# Patient Record
Sex: FEMALE | Race: WHITE | Employment: OTHER | URBAN - METROPOLITAN AREA
[De-identification: names, ages, dates, MRNs, and addresses within clinical notes are randomized per-mention and may not be internally consistent; named-entity substitution may affect disease eponyms.]

---

## 2017-03-16 RX ORDER — ALPRAZOLAM 0.5 MG/1
TABLET ORAL
Qty: 60 TABLET | Refills: 0 | Status: SHIPPED
Start: 2017-03-16 | End: 2017-04-13

## 2017-04-14 RX ORDER — ZOLPIDEM TARTRATE 10 MG/1
TABLET ORAL
Qty: 30 TABLET | Refills: 0 | Status: SHIPPED
Start: 2017-04-14 | End: 2017-07-03

## 2017-04-14 RX ORDER — ALPRAZOLAM 0.5 MG/1
TABLET ORAL
Qty: 60 TABLET | Refills: 0 | Status: SHIPPED
Start: 2017-04-14 | End: 2017-05-13

## 2017-05-15 RX ORDER — SIMVASTATIN 20 MG
TABLET ORAL
Qty: 30 TABLET | Refills: 0 | Status: SHIPPED | OUTPATIENT
Start: 2017-05-15 | End: 2017-07-03

## 2017-05-16 RX ORDER — ALPRAZOLAM 0.5 MG/1
TABLET ORAL
Qty: 60 TABLET | Refills: 1 | Status: SHIPPED
Start: 2017-05-16 | End: 2017-07-03

## 2017-07-03 ENCOUNTER — OFFICE VISIT (OUTPATIENT)
Dept: FAMILY MEDICINE CLINIC | Facility: CLINIC | Age: 52
End: 2017-07-03

## 2017-07-03 VITALS
TEMPERATURE: 98 F | HEART RATE: 75 BPM | HEIGHT: 58 IN | BODY MASS INDEX: 22.67 KG/M2 | SYSTOLIC BLOOD PRESSURE: 108 MMHG | WEIGHT: 108 LBS | DIASTOLIC BLOOD PRESSURE: 72 MMHG | RESPIRATION RATE: 16 BRPM | OXYGEN SATURATION: 97 %

## 2017-07-03 DIAGNOSIS — F41.1 GAD (GENERALIZED ANXIETY DISORDER): ICD-10-CM

## 2017-07-03 DIAGNOSIS — E78.2 MIXED HYPERLIPIDEMIA: ICD-10-CM

## 2017-07-03 DIAGNOSIS — E03.9 HYPOTHYROIDISM, UNSPECIFIED TYPE: ICD-10-CM

## 2017-07-03 DIAGNOSIS — M54.50 ACUTE MIDLINE LOW BACK PAIN WITHOUT SCIATICA: Primary | ICD-10-CM

## 2017-07-03 DIAGNOSIS — Z12.39 SCREENING FOR BREAST CANCER: ICD-10-CM

## 2017-07-03 DIAGNOSIS — G43.001 MIGRAINE WITHOUT AURA AND WITH STATUS MIGRAINOSUS, NOT INTRACTABLE: ICD-10-CM

## 2017-07-03 PROCEDURE — 99214 OFFICE O/P EST MOD 30 MIN: CPT | Performed by: PHYSICIAN ASSISTANT

## 2017-07-03 RX ORDER — SUMATRIPTAN 100 MG/1
100 TABLET, FILM COATED ORAL EVERY 2 HOUR PRN
Qty: 10 TABLET | Refills: 3 | Status: SHIPPED | OUTPATIENT
Start: 2017-07-03 | End: 2017-10-17

## 2017-07-03 RX ORDER — ZOLPIDEM TARTRATE 10 MG/1
TABLET ORAL
Qty: 30 TABLET | Refills: 0 | Status: SHIPPED | OUTPATIENT
Start: 2017-07-03 | End: 2017-10-25

## 2017-07-03 RX ORDER — ALPRAZOLAM 0.5 MG/1
TABLET ORAL
Qty: 60 TABLET | Refills: 1 | Status: SHIPPED | OUTPATIENT
Start: 2017-07-03 | End: 2017-09-26

## 2017-07-03 RX ORDER — SIMVASTATIN 40 MG
40 TABLET ORAL NIGHTLY
Qty: 30 TABLET | Refills: 2 | Status: SHIPPED | OUTPATIENT
Start: 2017-07-03 | End: 2017-10-25 | Stop reason: ALTCHOICE

## 2017-07-03 RX ORDER — MECLIZINE HCL 12.5 MG/1
TABLET ORAL
Qty: 30 TABLET | Refills: 1 | Status: SHIPPED | OUTPATIENT
Start: 2017-07-03 | End: 2017-09-26

## 2017-07-03 RX ORDER — LEVOTHYROXINE SODIUM 0.1 MG/1
TABLET ORAL
Qty: 30 TABLET | Refills: 2 | Status: SHIPPED | OUTPATIENT
Start: 2017-07-03 | End: 2017-10-25

## 2017-07-03 RX ORDER — METHYLPREDNISOLONE 4 MG/1
TABLET ORAL
Qty: 1 KIT | Refills: 0 | Status: SHIPPED | OUTPATIENT
Start: 2017-07-03 | End: 2017-10-11

## 2017-07-03 NOTE — PROGRESS NOTES
CHIEF COMPLAINT:     Patient presents with:  Lab Results      HPI:   Monique Mercedes is a 46year old female who presents for lab results and medication refills. Pt is also c/o intermittent midline low back pain that comes and goes.  The pain is described a 30 tablet Rfl: 0   Meclizine HCl 12.5 MG Oral Tab TAKE 2 TABLETS BY MOUTH TWO TIMES A DAY AS NEEDED FOR DIZZINESS Disp: 30 tablet Rfl: 1   SUMAtriptan Succinate (IMITREX) 100 MG Oral Tab Take 1 tablet (100 mg total) by mouth every 2 (two) hours as needed f Alcohol use: Yes              Comment: occasionally       REVIEW OF SYSTEMS:   GENERAL: Denies fever, chills,weight change, decreased appetite  SKIN: Denies rashes, skin wounds or ulcers.   EYES: Denies blurred vision or double vision  HENT: Denies conges regimen. Migraine without aura and with status migrainosus, not intractable  -     Meclizine HCl 12.5 MG Oral Tab; TAKE 2 TABLETS BY MOUTH TWO TIMES A DAY AS NEEDED FOR DIZZINESS  -     SUMAtriptan Succinate (IMITREX) 100 MG Oral Tab;  Take 1 tablet (100

## 2017-07-06 ENCOUNTER — TELEPHONE (OUTPATIENT)
Dept: INTERNAL MEDICINE CLINIC | Facility: CLINIC | Age: 52
End: 2017-07-06

## 2017-07-06 NOTE — TELEPHONE ENCOUNTER
Angel Mueller was in Monday 6/3, labs/meds reviewed by Helane Lundborg. Simvastatin increased from 20mg to 40mg on 7/3 and will repeat liver, lipids in 3 months. Assume you do not want her to increase from 40-60mg?      Told pt will call back if statin med changes othe murmur loudness: II/VI

## 2017-07-06 NOTE — TELEPHONE ENCOUNTER
----- Message from Laura Mcmullen MD sent at 7/5/2017 10:47 AM CDT -----  Stable, increase statin to 60 mg q daily, repeat ast/alt and lipid in 3 months. Needs tsh and t4 in 3 months too.

## 2017-08-04 ENCOUNTER — TELEPHONE (OUTPATIENT)
Dept: FAMILY MEDICINE CLINIC | Facility: CLINIC | Age: 52
End: 2017-08-04

## 2017-08-04 NOTE — TELEPHONE ENCOUNTER
I spoke with Flor Begum from [address] [place] [address], she states they have no refills left for pt's Xanax, her last fill was on 7/3 but she states that was a refill from a previous Rx on file.   She has no records that pt abhay in her paper Script to them, even though she was giv

## 2017-08-04 NOTE — TELEPHONE ENCOUNTER
Felicia Menendez from [facility] called and said they don't have the script, please call in at [phone]

## 2017-09-26 DIAGNOSIS — F41.1 GAD (GENERALIZED ANXIETY DISORDER): ICD-10-CM

## 2017-09-26 DIAGNOSIS — G43.001 MIGRAINE WITHOUT AURA AND WITH STATUS MIGRAINOSUS, NOT INTRACTABLE: ICD-10-CM

## 2017-09-26 RX ORDER — MECLIZINE HCL 12.5 MG/1
TABLET ORAL
Qty: 30 TABLET | Refills: 1 | Status: SHIPPED | OUTPATIENT
Start: 2017-09-26 | End: 2017-10-25

## 2017-09-26 RX ORDER — ALPRAZOLAM 0.5 MG/1
TABLET ORAL
Qty: 60 TABLET | Refills: 1 | Status: SHIPPED
Start: 2017-09-26 | End: 2017-10-25

## 2017-09-26 NOTE — TELEPHONE ENCOUNTER
LOV 7/3/2017           LF 7/3/2017 for both medications pended     Please approve or deny Rx request.  Thank you!

## 2017-10-17 ENCOUNTER — TELEPHONE (OUTPATIENT)
Dept: FAMILY MEDICINE CLINIC | Facility: CLINIC | Age: 52
End: 2017-10-17

## 2017-10-17 DIAGNOSIS — G43.001 MIGRAINE WITHOUT AURA AND WITH STATUS MIGRAINOSUS, NOT INTRACTABLE: ICD-10-CM

## 2017-10-17 RX ORDER — SUMATRIPTAN 100 MG/1
100 TABLET, FILM COATED ORAL EVERY 2 HOUR PRN
Qty: 10 TABLET | Refills: 3 | Status: SHIPPED | OUTPATIENT
Start: 2017-10-17 | End: 2017-10-18

## 2017-10-17 NOTE — TELEPHONE ENCOUNTER
Pharmacy called for clarification of   SUMAtriptan Succinate (IMITREX) 100 MG Oral Tab 10 tablet     For patient, please call pharmacy back.

## 2017-10-17 NOTE — TELEPHONE ENCOUNTER
[facility] states they have requested the refill for the Imitrex 20 mg on 9/28/17 and 9/29/17 still awaiting response

## 2017-10-17 NOTE — TELEPHONE ENCOUNTER
Pharmacist states that the Imitrex is usually written \"take one tablet at onset of migraine. May repeat after 1 hour for max of 2 tabs daily\" Pharmacist would like to know if provider would like to change instructions. Please advise.   Thank you

## 2017-10-18 ENCOUNTER — HOSPITAL ENCOUNTER (OUTPATIENT)
Dept: GENERAL RADIOLOGY | Age: 52
Discharge: HOME | End: 2017-10-18
Attending: PHYSICIAN ASSISTANT
Payer: COMMERCIAL

## 2017-10-18 ENCOUNTER — HOSPITAL ENCOUNTER (OUTPATIENT)
Dept: MAMMOGRAPHY | Age: 52
Discharge: HOME | End: 2017-10-18
Attending: PHYSICIAN ASSISTANT
Payer: COMMERCIAL

## 2017-10-18 DIAGNOSIS — Z12.39 SCREENING FOR BREAST CANCER: ICD-10-CM

## 2017-10-18 DIAGNOSIS — M54.50 ACUTE MIDLINE LOW BACK PAIN WITHOUT SCIATICA: ICD-10-CM

## 2017-10-18 PROCEDURE — 72110 X-RAY EXAM L-2 SPINE 4/>VWS: CPT | Performed by: PHYSICIAN ASSISTANT

## 2017-10-18 PROCEDURE — 77067 SCR MAMMO BI INCL CAD: CPT | Performed by: PHYSICIAN ASSISTANT

## 2017-10-18 RX ORDER — SUMATRIPTAN 100 MG/1
TABLET, FILM COATED ORAL
Qty: 10 TABLET | Refills: 3 | Status: SHIPPED | OUTPATIENT
Start: 2017-10-18 | End: 2017-10-25

## 2017-10-18 NOTE — TELEPHONE ENCOUNTER
Its always been prescribed as \"Take 1 tablet (100 mg total) by mouth every 2 (two) hours as needed for Migraine. \"  (pharmacies always call and clarify when written like this)  Ok to add max 200mg/24h or as pharmacist recommends below?

## 2017-10-19 ENCOUNTER — TELEPHONE (OUTPATIENT)
Dept: FAMILY MEDICINE CLINIC | Facility: CLINIC | Age: 52
End: 2017-10-19

## 2017-10-19 NOTE — TELEPHONE ENCOUNTER
----- [facility] sent at 10/18/2017  2:02 PM CDT -----  Normal lumbar spine xray.

## 2017-10-19 NOTE — TELEPHONE ENCOUNTER
Pt notified of Arlina Face results & below orders. All questions answered, pt expresses understanding.

## 2017-10-19 NOTE — TELEPHONE ENCOUNTER
----- [facility] sent at 10/19/2017  8:29 AM CDT -----  Normal mammogram. Next mammogram in one year.

## 2017-10-25 ENCOUNTER — OFFICE VISIT (OUTPATIENT)
Dept: FAMILY MEDICINE CLINIC | Facility: CLINIC | Age: 52
End: 2017-10-25

## 2017-10-25 VITALS
RESPIRATION RATE: 16 BRPM | OXYGEN SATURATION: 97 % | TEMPERATURE: 98 F | WEIGHT: 107 LBS | DIASTOLIC BLOOD PRESSURE: 70 MMHG | BODY MASS INDEX: 22.46 KG/M2 | HEIGHT: 58 IN | HEART RATE: 91 BPM | SYSTOLIC BLOOD PRESSURE: 116 MMHG

## 2017-10-25 DIAGNOSIS — G43.001 MIGRAINE WITHOUT AURA AND WITH STATUS MIGRAINOSUS, NOT INTRACTABLE: ICD-10-CM

## 2017-10-25 DIAGNOSIS — E03.9 HYPOTHYROIDISM, UNSPECIFIED TYPE: ICD-10-CM

## 2017-10-25 DIAGNOSIS — E78.2 MIXED HYPERLIPIDEMIA: Primary | ICD-10-CM

## 2017-10-25 DIAGNOSIS — F41.1 GAD (GENERALIZED ANXIETY DISORDER): ICD-10-CM

## 2017-10-25 PROCEDURE — 99214 OFFICE O/P EST MOD 30 MIN: CPT | Performed by: PHYSICIAN ASSISTANT

## 2017-10-25 RX ORDER — SUMATRIPTAN 100 MG/1
TABLET, FILM COATED ORAL
Qty: 10 TABLET | Refills: 3 | Status: SHIPPED | OUTPATIENT
Start: 2017-10-25 | End: 2018-05-12

## 2017-10-25 RX ORDER — ROSUVASTATIN CALCIUM 40 MG/1
40 TABLET, COATED ORAL NIGHTLY
Qty: 90 TABLET | Refills: 0 | Status: SHIPPED | OUTPATIENT
Start: 2017-10-25 | End: 2018-01-25

## 2017-10-25 RX ORDER — ZOLPIDEM TARTRATE 10 MG/1
TABLET ORAL
Qty: 30 TABLET | Refills: 0 | Status: CANCELLED | OUTPATIENT
Start: 2017-10-25

## 2017-10-25 RX ORDER — ZOLPIDEM TARTRATE 10 MG/1
TABLET ORAL
Qty: 30 TABLET | Refills: 1 | Status: SHIPPED | OUTPATIENT
Start: 2017-10-25 | End: 2018-01-25

## 2017-10-25 RX ORDER — LEVOTHYROXINE SODIUM 0.1 MG/1
TABLET ORAL
Qty: 90 TABLET | Refills: 3 | Status: SHIPPED | OUTPATIENT
Start: 2017-10-25 | End: 2018-05-12

## 2017-10-25 RX ORDER — ALPRAZOLAM 0.5 MG/1
TABLET ORAL
Qty: 60 TABLET | Refills: 1 | Status: SHIPPED | OUTPATIENT
Start: 2017-10-25 | End: 2018-01-18

## 2017-10-25 RX ORDER — MECLIZINE HCL 12.5 MG/1
TABLET ORAL
Qty: 30 TABLET | Refills: 1 | Status: CANCELLED | OUTPATIENT
Start: 2017-10-25

## 2017-10-25 RX ORDER — ALPRAZOLAM 0.5 MG/1
TABLET ORAL
Qty: 60 TABLET | Refills: 1 | Status: CANCELLED | OUTPATIENT
Start: 2017-10-25

## 2017-10-25 RX ORDER — SIMVASTATIN 40 MG
40 TABLET ORAL NIGHTLY
Qty: 30 TABLET | Refills: 2 | Status: CANCELLED | OUTPATIENT
Start: 2017-10-25

## 2017-10-25 RX ORDER — SUMATRIPTAN 100 MG/1
TABLET, FILM COATED ORAL
Qty: 10 TABLET | Refills: 3 | Status: CANCELLED | OUTPATIENT
Start: 2017-10-25

## 2017-10-25 RX ORDER — MECLIZINE HCL 12.5 MG/1
TABLET ORAL
Qty: 30 TABLET | Refills: 1 | Status: SHIPPED | OUTPATIENT
Start: 2017-10-25 | End: 2018-01-25

## 2017-10-25 RX ORDER — BUTALBITAL, ACETAMINOPHEN, CAFFEINE, AND CODEINE PHOSPHATE 50; 300; 40; 30 MG/1; MG/1; MG/1; MG/1
CAPSULE ORAL
COMMUNITY
Start: 2017-10-23 | End: 2018-01-25

## 2017-10-25 RX ORDER — LEVOTHYROXINE SODIUM 0.1 MG/1
TABLET ORAL
Qty: 30 TABLET | Refills: 2 | Status: CANCELLED | OUTPATIENT
Start: 2017-10-25

## 2017-10-25 NOTE — PROGRESS NOTES
CHIEF COMPLAINT:     Patient presents with:  Test Results: med refills       HPI:   Rosalia Perez is a 46year old female who presents to f/u on lab results:    Component      Latest Ref Rng & Units 10/19/2017   GLUCOSE      65 - 99 mg/dL 95   BUN      7 10 MG Oral Tab TAKE ONE TABLET BY MOUTH EVERY NIGHT AT BEDTIME AS NEEDED Disp: 30 tablet Rfl: 1   Rosuvastatin Calcium 40 MG Oral Tab Take 1 tablet (40 mg total) by mouth nightly.  Disp: 90 tablet Rfl: 0   aspirin (KP ASPIRIN) 81 MG Oral Tab EC Take 81 mg b pain  CHEST: Denies chest pain, or palpitations  LUNGS: Denies shortness of breath, cough, or wheezing  GI: Denies abdominal pain, N/V/C/D.   MUSCULOSKELETAL: no arthralgia or swollen joints  LYMPH:  Denies lymphadenopathy  NEURO: Denies headaches or light time. She is only to take one or the other. mammo up to date and was normal.  Pt has FIT test at home. Reminded her to do this.     Meds & Refills for this Visit:    Signed Prescriptions Disp Refills    Levothyroxine Sodium 100 MCG Oral Tab 90 tablet 3

## 2018-01-18 ENCOUNTER — APPOINTMENT (OUTPATIENT)
Dept: GENERAL RADIOLOGY | Age: 53
End: 2018-01-18
Attending: EMERGENCY MEDICINE
Payer: COMMERCIAL

## 2018-01-18 ENCOUNTER — HOSPITAL ENCOUNTER (EMERGENCY)
Age: 53
Discharge: HOME | End: 2018-01-18
Attending: EMERGENCY MEDICINE
Payer: COMMERCIAL

## 2018-01-18 ENCOUNTER — OFFICE VISIT (OUTPATIENT)
Dept: FAMILY MEDICINE CLINIC | Facility: CLINIC | Age: 53
End: 2018-01-18

## 2018-01-18 VITALS
HEART RATE: 65 BPM | HEIGHT: 58 IN | OXYGEN SATURATION: 97 % | WEIGHT: 110 LBS | TEMPERATURE: 99 F | SYSTOLIC BLOOD PRESSURE: 115 MMHG | BODY MASS INDEX: 23.09 KG/M2 | RESPIRATION RATE: 16 BRPM | DIASTOLIC BLOOD PRESSURE: 83 MMHG

## 2018-01-18 VITALS
BODY MASS INDEX: 23 KG/M2 | HEART RATE: 86 BPM | WEIGHT: 110 LBS | DIASTOLIC BLOOD PRESSURE: 70 MMHG | TEMPERATURE: 98 F | SYSTOLIC BLOOD PRESSURE: 125 MMHG | RESPIRATION RATE: 18 BRPM | OXYGEN SATURATION: 97 %

## 2018-01-18 DIAGNOSIS — Z02.9 ENCOUNTERS FOR ADMINISTRATIVE PURPOSES: Primary | ICD-10-CM

## 2018-01-18 DIAGNOSIS — F41.1 GAD (GENERALIZED ANXIETY DISORDER): ICD-10-CM

## 2018-01-18 DIAGNOSIS — R06.02 SHORTNESS OF BREATH: Primary | ICD-10-CM

## 2018-01-18 PROCEDURE — 71046 X-RAY EXAM CHEST 2 VIEWS: CPT | Performed by: EMERGENCY MEDICINE

## 2018-01-18 PROCEDURE — 99283 EMERGENCY DEPT VISIT LOW MDM: CPT

## 2018-01-18 RX ORDER — AMOXICILLIN AND CLAVULANATE POTASSIUM 875; 125 MG/1; MG/1
1 TABLET, FILM COATED ORAL 2 TIMES DAILY
Qty: 20 TABLET | Refills: 0 | Status: SHIPPED | OUTPATIENT
Start: 2018-01-18 | End: 2018-01-28

## 2018-01-18 RX ORDER — ALPRAZOLAM 0.5 MG/1
TABLET ORAL
Qty: 60 TABLET | Refills: 1 | Status: SHIPPED
Start: 2018-01-18 | End: 2018-01-25

## 2018-01-18 RX ORDER — ALBUTEROL SULFATE 90 UG/1
2 AEROSOL, METERED RESPIRATORY (INHALATION) EVERY 4 HOURS PRN
Qty: 1 INHALER | Refills: 0 | Status: SHIPPED | OUTPATIENT
Start: 2018-01-18 | End: 2018-02-17

## 2018-01-18 NOTE — PROGRESS NOTES
CHIEF COMPLAINT:   Patient presents with:  Shortness Of Breath: pt c\o of SOB for x2wks back burning, pt c\o not being able to take a deap breath. HPI:   Miroslavaswetha Elliott is a 46year old who presents with worsening SOB for 2 weeks.   Patient delivers p every 4 (four) hours as needed for Pain or Headaches.  Disp: 15 capsule Rfl: 3      Past Medical History:   Diagnosis Date   • Anemia    • Anxiety    • Bronchitis    • Cervicalgia    • Chronically on benzodiazepine therapy 8/23/2016   • Hyperlipidemia    • respiratory symptoms that are consistent with    ASSESSMENT:   Encounters for administrative purposes  (primary encounter diagnosis)    PLAN: Due to cardiac history and limitations of [facility], patient referred to PED. No xray on site at this time.   Patient ad

## 2018-01-19 NOTE — ED PROVIDER NOTES
Patient Seen in: [facility] Emergency Department In [place]    History   Patient presents with:  Dyspnea FANY SOB (respiratory)    Stated Complaint: FANY ON EXERTION SINCE 2 WEEKS.      HPI    Is a 41-year-old female coming with complaints shortness of breat Device: None (Room air)    Current:/49   Pulse 69   Temp (!) 97.1 °F (36.2 °C) (Oral)   Resp 18   Ht 147.3 cm (4' 10\")   Wt 49.9 kg   SpO2 97%   BMI 22.99 kg/m²         Physical Exam    Generally the patient is alert and oriented ×3 and appears in n on file for this visit.     Follow-up:  Michael Colvin, [address] [phone]    In 1 day          Medications Prescribed:  Current Discharge Medication List    START taking these medications    Amoxicillin-Pot Clavulanate 875-1

## 2018-01-25 ENCOUNTER — OFFICE VISIT (OUTPATIENT)
Dept: FAMILY MEDICINE CLINIC | Facility: CLINIC | Age: 53
End: 2018-01-25

## 2018-01-25 VITALS
HEIGHT: 58.75 IN | WEIGHT: 109 LBS | DIASTOLIC BLOOD PRESSURE: 56 MMHG | SYSTOLIC BLOOD PRESSURE: 84 MMHG | TEMPERATURE: 99 F | BODY MASS INDEX: 22.27 KG/M2 | HEART RATE: 74 BPM | RESPIRATION RATE: 16 BRPM

## 2018-01-25 DIAGNOSIS — E03.9 HYPOTHYROIDISM, UNSPECIFIED TYPE: ICD-10-CM

## 2018-01-25 DIAGNOSIS — F41.1 GAD (GENERALIZED ANXIETY DISORDER): ICD-10-CM

## 2018-01-25 DIAGNOSIS — Z79.899 CHRONICALLY ON BENZODIAZEPINE THERAPY: Primary | ICD-10-CM

## 2018-01-25 DIAGNOSIS — G43.001 MIGRAINE WITHOUT AURA AND WITH STATUS MIGRAINOSUS, NOT INTRACTABLE: ICD-10-CM

## 2018-01-25 DIAGNOSIS — Z12.39 SCREENING FOR BREAST CANCER: ICD-10-CM

## 2018-01-25 DIAGNOSIS — E78.5 HYPERLIPIDEMIA, UNSPECIFIED HYPERLIPIDEMIA TYPE: ICD-10-CM

## 2018-01-25 DIAGNOSIS — Z13.21 ENCOUNTER FOR VITAMIN DEFICIENCY SCREENING: ICD-10-CM

## 2018-01-25 DIAGNOSIS — J40 BRONCHITIS: ICD-10-CM

## 2018-01-25 DIAGNOSIS — E78.2 MIXED HYPERLIPIDEMIA: ICD-10-CM

## 2018-01-25 DIAGNOSIS — Z13.21 SCREENING FOR ENDOCRINE, NUTRITIONAL, METABOLIC AND IMMUNITY DISORDER: ICD-10-CM

## 2018-01-25 DIAGNOSIS — Z13.29 SCREENING FOR ENDOCRINE, NUTRITIONAL, METABOLIC AND IMMUNITY DISORDER: ICD-10-CM

## 2018-01-25 DIAGNOSIS — I95.9 HYPOTENSION, UNSPECIFIED HYPOTENSION TYPE: ICD-10-CM

## 2018-01-25 DIAGNOSIS — Z13.228 SCREENING FOR ENDOCRINE, NUTRITIONAL, METABOLIC AND IMMUNITY DISORDER: ICD-10-CM

## 2018-01-25 DIAGNOSIS — Z12.11 COLON CANCER SCREENING: ICD-10-CM

## 2018-01-25 DIAGNOSIS — Z13.0 SCREENING FOR ENDOCRINE, NUTRITIONAL, METABOLIC AND IMMUNITY DISORDER: ICD-10-CM

## 2018-01-25 PROCEDURE — 99215 OFFICE O/P EST HI 40 MIN: CPT | Performed by: FAMILY MEDICINE

## 2018-01-25 RX ORDER — ALPRAZOLAM 0.5 MG/1
TABLET ORAL
Qty: 60 TABLET | Refills: 1 | Status: SHIPPED | OUTPATIENT
Start: 2018-01-25 | End: 2018-05-12

## 2018-01-25 RX ORDER — ZOLPIDEM TARTRATE 10 MG/1
TABLET ORAL
Qty: 30 TABLET | Refills: 1 | Status: SHIPPED | OUTPATIENT
Start: 2018-01-25 | End: 2018-05-12

## 2018-01-25 RX ORDER — MECLIZINE HCL 12.5 MG/1
TABLET ORAL
Qty: 30 TABLET | Refills: 1 | Status: SHIPPED | OUTPATIENT
Start: 2018-01-25 | End: 2018-03-18

## 2018-01-25 RX ORDER — ROSUVASTATIN CALCIUM 40 MG/1
40 TABLET, COATED ORAL NIGHTLY
Qty: 90 TABLET | Refills: 0 | Status: SHIPPED | OUTPATIENT
Start: 2018-01-25 | End: 2018-05-12

## 2018-01-26 ENCOUNTER — OFFICE VISIT (OUTPATIENT)
Dept: FAMILY MEDICINE CLINIC | Facility: CLINIC | Age: 53
End: 2018-01-26

## 2018-01-26 VITALS — DIASTOLIC BLOOD PRESSURE: 64 MMHG | SYSTOLIC BLOOD PRESSURE: 110 MMHG

## 2018-01-26 DIAGNOSIS — Z01.30 BLOOD PRESSURE CHECK: Primary | ICD-10-CM

## 2018-01-27 LAB
ABSOLUTE BASOPHILS: 102 CELLS/UL (ref 0–200)
ABSOLUTE EOSINOPHILS: 521 CELLS/UL (ref 15–500)
ABSOLUTE LYMPHOCYTES: 1651 CELLS/UL (ref 850–3900)
ABSOLUTE MONOCYTES: 495 CELLS/UL (ref 200–950)
ABSOLUTE NEUTROPHILS: 9931 CELLS/UL (ref 1500–7800)
ALBUMIN/GLOBULIN RATIO: 1.3 (CALC) (ref 1–2.5)
ALBUMIN: 4 G/DL (ref 3.6–5.1)
ALKALINE PHOSPHATASE: 157 U/L (ref 33–130)
ALT: 24 U/L (ref 6–29)
AST: 22 U/L (ref 10–35)
BASOPHILS: 0.8 %
BILIRUBIN, TOTAL: 0.3 MG/DL (ref 0.2–1.2)
BUN: 15 MG/DL (ref 7–25)
CALCIUM: 9.4 MG/DL (ref 8.6–10.4)
CARBON DIOXIDE: 27 MMOL/L (ref 20–31)
CHLORIDE: 104 MMOL/L (ref 98–110)
CHOL/HDLC RATIO: 5.6 (CALC)
CHOLESTEROL, TOTAL: 152 MG/DL
CREATININE: 1.02 MG/DL (ref 0.5–1.05)
EGFR IF AFRICN AM: 73 ML/MIN/1.73M2
EGFR IF NONAFRICN AM: 63 ML/MIN/1.73M2
EOSINOPHILS: 4.1 %
GLOBULIN: 3 G/DL (CALC) (ref 1.9–3.7)
GLUCOSE: 91 MG/DL (ref 65–99)
HDL CHOLESTEROL: 27 MG/DL
HEMATOCRIT: 37.1 % (ref 35–45)
HEMOGLOBIN: 12.3 G/DL (ref 11.7–15.5)
LDL-CHOLESTEROL: 101 MG/DL (CALC)
LYMPHOCYTES: 13 %
MCH: 31.2 PG (ref 27–33)
MCHC: 33.2 G/DL (ref 32–36)
MCV: 94.2 FL (ref 80–100)
MONOCYTES: 3.9 %
MPV: 11.5 FL (ref 7.5–12.5)
NEUTROPHILS: 78.2 %
NON-HDL CHOLESTEROL: 125 MG/DL (CALC)
PLATELET COUNT: 282 THOUSAND/UL (ref 140–400)
POTASSIUM: 4.7 MMOL/L (ref 3.5–5.3)
PROTEIN, TOTAL: 7 G/DL (ref 6.1–8.1)
RDW: 12.9 % (ref 11–15)
RED BLOOD CELL COUNT: 3.94 MILLION/UL (ref 3.8–5.1)
SODIUM: 137 MMOL/L (ref 135–146)
TRIGLYCERIDES: 143 MG/DL
TSH W/REFLEX TO FT4: 0.44 MIU/L
VITAMIN D, 25-OH, TOTAL: 53 NG/ML (ref 30–100)
WHITE BLOOD CELL COUNT: 12.7 THOUSAND/UL (ref 3.8–10.8)

## 2018-01-28 NOTE — PROGRESS NOTES
Chief Complaint:   Patient presents with: Follow - Up    HPI:   This is a 46year old female presenting for follow up:  Pt had a history of hypothyroidism and here to recheck. Has been tolerating the medication well. Last TSH was year ago, needs repeat.  D VALVE REPLACEMENT      Comment: aortic  Social History:  Smoking status: Current Every Day Smoker                                                   Packs/day: 0.50      Years: 0.00      Smokeless tobacco: Never Used                      Alcohol use:  No 1 OR 2 CAPSULES EVERY 4 HOURS AS NEEDED Disp: 30 capsule Rfl: 0   Acetaminophen-Codeine (TYLENOL WITH CODEINE #3) 300-30 MG Oral Tab 1 OR 2 TABLETS EVERY 4 TO 6 HOURS AS NEEDED Disp: 30 tablet Rfl: 0   aspirin-butalbital-caffeine (FIORINAL) -40 MG Or Ht 58.75\"   Wt 109 lb   BMI 22.20 kg/m²  Estimated body mass index is 22.2 kg/m² as calculated from the following:    Height as of this encounter: 58.75\". Weight as of this encounter: 109 lb. Vital signs reviewed. Appears stated age, well groomed.   P erythema. No pallor. Does not exhibit alopecia  Psychiatric: She has a normal mood and affect. Her behavior is normal. Judgment and thought content normal.        ASSESSMENT AND PLAN:     1.  Chronically on benzodiazepine therapy  -stable, CPM, not abusing, medication reviewed and low BP  -patient will come back for BP rx, will get labs.  Follow up for HTN check     -ER if acute symptoms, ie presyncope, dizziness, chest pain

## 2018-01-30 ENCOUNTER — TELEPHONE (OUTPATIENT)
Dept: FAMILY MEDICINE CLINIC | Facility: CLINIC | Age: 53
End: 2018-01-30

## 2018-01-30 DIAGNOSIS — D72.829 LEUKOCYTOSIS, UNSPECIFIED TYPE: Primary | ICD-10-CM

## 2018-01-30 NOTE — TELEPHONE ENCOUNTER
Spoke with pt regarding results. Pt questioned why her BP was so low when she was here in the office. Spoke with Dr. Melissa Taylor, who advised BP may have been low due to dehydration, Xanax use before appt, or possible infection.  Dr. Melissa Taylor also advised to

## 2018-02-13 ENCOUNTER — TELEPHONE (OUTPATIENT)
Dept: FAMILY MEDICINE CLINIC | Facility: CLINIC | Age: 53
End: 2018-02-13

## 2018-02-13 NOTE — TELEPHONE ENCOUNTER
Imitrex 100 mg  Would like to change to total of 2 per day (take 1 and after 2 hours another as needed).  Please advise

## 2018-02-15 NOTE — TELEPHONE ENCOUNTER
Patient's Imitrex Rx is written for 1 tablet at onset of headache may repeat after 1 hour. Pharmacy states that patient should repeat dose after 2 hours. Okay for updated directions? Please advise. Thank you!

## 2018-02-16 PROBLEM — Q78.6 HEREDITARY MULTIPLE EXOSTOSIS: Status: ACTIVE | Noted: 2018-02-16

## 2018-02-16 NOTE — TELEPHONE ENCOUNTER
Spoke to [facility] at [address] [place] [address] of below. She states understanding. No further questions.

## 2018-02-17 PROBLEM — D72.829 LEUKOCYTOSIS: Status: ACTIVE | Noted: 2018-02-17

## 2018-02-17 PROBLEM — D72.0: Status: ACTIVE | Noted: 2018-02-17

## 2018-02-17 NOTE — PROGRESS NOTES
Chief Complaint:   Patient presents with:  Lab Results    HPI:   This is a 46year old female presenting for follow up:  Pt had a history of hypothyroidism and here to recheck. Has been tolerating the medication well. Last TSH was year ago, needs repeat.  D exostosis, hereditary    • Multiple exostosis, hereditary    • Raynaud phenomenon    • Rheumatic mitral stenosis    • Thyroid disease      Past Surgical History:  No date:   No date: LASIK  2012: REPLACE AORTIC VALVE OPEN  2012: VALVE REPAIR Rfl:    Orphenadrine Citrate  MG Oral Tablet 12 Hr Take 100 mg by mouth 2 (two) times daily.  Disp:  Rfl: 2   Butalbital-APAP-Caff-Cod (FIORICET/CODEINE) -77-30 MG Oral Cap 1 OR 2 CAPSULES EVERY 4 HOURS AS NEEDED Disp: 30 capsule Rfl: 0   Acetam is not nervous/anxious. Stable mood   All other systems reviewed and are negative.       EXAM:   BP 98/68   Pulse 80   Temp (!) 97.4 °F (36.3 °C) (Oral)   Resp 16   Ht 58\"   Wt 111 lb   BMI 23.20 kg/m²  Estimated body mass index is 23.2 kg/m² as ca cranial nerve deficit, sensory deficit or motor deficit. Coordination and gait normal.   Skin: Skin is warm and dry. No lesion and no rash noted. No erythema. No pallor. Does not exhibit alopecia  Psychiatric: She has a normal mood and affect.  Her behavior

## 2018-02-19 ENCOUNTER — APPOINTMENT (OUTPATIENT)
Dept: GENERAL RADIOLOGY | Age: 53
End: 2018-02-19
Attending: EMERGENCY MEDICINE
Payer: COMMERCIAL

## 2018-02-19 ENCOUNTER — HOSPITAL ENCOUNTER (EMERGENCY)
Age: 53
Discharge: HOME | End: 2018-02-19
Attending: EMERGENCY MEDICINE
Payer: COMMERCIAL

## 2018-02-19 VITALS
HEART RATE: 79 BPM | RESPIRATION RATE: 18 BRPM | BODY MASS INDEX: 23 KG/M2 | WEIGHT: 110.25 LBS | OXYGEN SATURATION: 99 % | SYSTOLIC BLOOD PRESSURE: 114 MMHG | TEMPERATURE: 99 F | DIASTOLIC BLOOD PRESSURE: 62 MMHG

## 2018-02-19 DIAGNOSIS — M77.9 TENDONITIS: Primary | ICD-10-CM

## 2018-02-19 PROCEDURE — 99283 EMERGENCY DEPT VISIT LOW MDM: CPT

## 2018-02-19 PROCEDURE — 73130 X-RAY EXAM OF HAND: CPT | Performed by: EMERGENCY MEDICINE

## 2018-02-19 RX ORDER — TRAMADOL HYDROCHLORIDE 50 MG/1
TABLET ORAL EVERY 4 HOURS PRN
Qty: 20 TABLET | Refills: 0 | Status: SHIPPED | OUTPATIENT
Start: 2018-02-19 | End: 2018-02-26

## 2018-02-19 RX ORDER — METHYLPREDNISOLONE 4 MG/1
TABLET ORAL
Qty: 1 PACKAGE | Refills: 0 | Status: SHIPPED | OUTPATIENT
Start: 2018-02-19 | End: 2018-02-24

## 2018-02-20 NOTE — ED INITIAL ASSESSMENT (HPI)
Pt c/o left hand pain, denies trauma. States she has a bone conditon/tumor in her bones. Pain started on Wednesday.

## 2018-02-20 NOTE — ED PROVIDER NOTES
Patient Seen in: [facility] Emergency Department In [place]    History   Patient presents with:  Upper Extremity Injury (musculoskeletal)    Stated Complaint: Left hand pain    HPI    51-year-old female with a history of anxiety, bronchitis, cervicalgia, 2200]  BP: 114/62  Pulse: 79  Resp: 18  Temp: 98.6 °F (37 °C)  Temp src: Temporal  SpO2: 99 %  O2 Device: None (Room air)    Current:/62   Pulse 79   Temp 98.6 °F (37 °C) (Temporal)   Resp 18   Wt 50 kg   SpO2 99%   BMI 23.04 kg/m²         Physical E Prescribed:  Discharge Medication List as of 2/19/2018 10:56 PM    START taking these medications    TraMADol HCl 50 MG Oral Tab  Take 1-2 tablets ( mg total) by mouth every 4 (four) hours as needed for Pain., Print Script, Disp-20 tablet, R-0    met

## 2018-03-18 DIAGNOSIS — G43.001 MIGRAINE WITHOUT AURA AND WITH STATUS MIGRAINOSUS, NOT INTRACTABLE: ICD-10-CM

## 2018-03-19 RX ORDER — MECLIZINE HCL 12.5 MG/1
TABLET ORAL
Qty: 30 TABLET | Refills: 0 | Status: SHIPPED | OUTPATIENT
Start: 2018-03-19 | End: 2018-05-12

## 2018-03-24 DIAGNOSIS — G43.001 MIGRAINE WITHOUT AURA AND WITH STATUS MIGRAINOSUS, NOT INTRACTABLE: ICD-10-CM

## 2018-03-26 RX ORDER — MECLIZINE HCL 12.5 MG/1
TABLET ORAL
Qty: 30 TABLET | Refills: 0 | OUTPATIENT
Start: 2018-03-26

## 2018-05-04 ENCOUNTER — TELEPHONE (OUTPATIENT)
Dept: FAMILY MEDICINE CLINIC | Facility: CLINIC | Age: 53
End: 2018-05-04

## 2018-05-04 NOTE — TELEPHONE ENCOUNTER
Patient stated she was given a lab order for blood work a while ago but had lost original copy.  She has an appointment coming up with Dr Yandel Gamble on Saturday May 12th, so she's requesting to have another lab order copy to  so she can have it done at

## 2018-05-12 ENCOUNTER — OFFICE VISIT (OUTPATIENT)
Dept: FAMILY MEDICINE CLINIC | Facility: CLINIC | Age: 53
End: 2018-05-12

## 2018-05-12 VITALS
HEART RATE: 72 BPM | BODY MASS INDEX: 23.3 KG/M2 | SYSTOLIC BLOOD PRESSURE: 108 MMHG | HEIGHT: 58 IN | TEMPERATURE: 99 F | WEIGHT: 111 LBS | RESPIRATION RATE: 16 BRPM | DIASTOLIC BLOOD PRESSURE: 68 MMHG

## 2018-05-12 DIAGNOSIS — M79.10 MYALGIA: ICD-10-CM

## 2018-05-12 DIAGNOSIS — F41.1 GAD (GENERALIZED ANXIETY DISORDER): ICD-10-CM

## 2018-05-12 DIAGNOSIS — Z92.89 HISTORY OF MRI OF BRAIN AND BRAIN STEM: ICD-10-CM

## 2018-05-12 DIAGNOSIS — E78.2 MIXED HYPERLIPIDEMIA: ICD-10-CM

## 2018-05-12 DIAGNOSIS — E03.9 HYPOTHYROIDISM, UNSPECIFIED TYPE: Primary | ICD-10-CM

## 2018-05-12 DIAGNOSIS — G43.001 MIGRAINE WITHOUT AURA AND WITH STATUS MIGRAINOSUS, NOT INTRACTABLE: ICD-10-CM

## 2018-05-12 PROCEDURE — 99215 OFFICE O/P EST HI 40 MIN: CPT | Performed by: FAMILY MEDICINE

## 2018-05-12 RX ORDER — SUMATRIPTAN 100 MG/1
TABLET, FILM COATED ORAL
Qty: 10 TABLET | Refills: 3 | Status: SHIPPED | OUTPATIENT
Start: 2018-05-12 | End: 2018-07-13

## 2018-05-12 RX ORDER — MECLIZINE HCL 12.5 MG/1
TABLET ORAL
Qty: 30 TABLET | Refills: 3 | Status: SHIPPED | OUTPATIENT
Start: 2018-05-12 | End: 2018-07-13

## 2018-05-12 RX ORDER — ZOLPIDEM TARTRATE 10 MG/1
TABLET ORAL
Qty: 30 TABLET | Refills: 6 | Status: SHIPPED | OUTPATIENT
Start: 2018-05-12 | End: 2018-07-13

## 2018-05-12 RX ORDER — ROSUVASTATIN CALCIUM 40 MG/1
40 TABLET, COATED ORAL NIGHTLY
Qty: 90 TABLET | Refills: 1 | Status: SHIPPED | OUTPATIENT
Start: 2018-05-12 | End: 2018-05-29

## 2018-05-12 RX ORDER — TRAMADOL HYDROCHLORIDE 50 MG/1
50 TABLET ORAL EVERY 8 HOURS PRN
Qty: 30 TABLET | Refills: 1 | Status: SHIPPED | OUTPATIENT
Start: 2018-05-12 | End: 2018-06-07 | Stop reason: ALTCHOICE

## 2018-05-12 RX ORDER — LEVOTHYROXINE SODIUM 0.1 MG/1
TABLET ORAL
Qty: 90 TABLET | Refills: 1 | Status: SHIPPED | OUTPATIENT
Start: 2018-05-12 | End: 2018-07-13

## 2018-05-12 RX ORDER — ALPRAZOLAM 0.5 MG/1
TABLET ORAL
Qty: 60 TABLET | Refills: 3 | Status: SHIPPED | OUTPATIENT
Start: 2018-05-12 | End: 2018-07-13

## 2018-05-14 NOTE — PROGRESS NOTES
Chief Complaint:   Patient presents with: Follow - Up    HPI:   This is a 46year old female presenting for follow up:  Pt had a history of hypothyroidism and here to recheck. Has been tolerating the medication well. Last TSH was year ago, needs repeat.  D exostosis, hereditary    • Multiple exostosis, hereditary    • Raynaud phenomenon    • Rheumatic mitral stenosis    • Thyroid disease      Past Surgical History:  No date:   No date: ECHO 2D, CARDIO ([facility])  No date: LASIK  2012: [address] [facility] Oral Tab EC Take 81 mg by mouth daily. Disp:  Rfl:    Cholecalciferol (D-5000) 5000 units Oral Tab Take 1 tablet by mouth daily. Disp:  Rfl:    Orphenadrine Citrate  MG Oral Tablet 12 Hr Take 100 mg by mouth 2 (two) times daily.  Disp:  Rfl: 2   Butal bruise/bleed easily. Psychiatric/Behavioral: Negative for suicidal ideas, behavioral problems, sleep disturbance and agitation. The patient is not nervous/anxious. Stable mood   All other systems reviewed and are negative.       EXAM:   /68 She has no cervical adenopathy. Neurological: She is alert and oriented to person, place, and time. She displays normal reflexes. No cranial nerve deficit, sensory deficit or motor deficit. Coordination and gait normal.   Skin: Skin is warm and dry.  No l 12/16/2015 at 13:28       Approved by: Tracy Evans MD              - MRI BRAIN ([id number]); Future    6. Myalgia  -stable, CPM  - TraMADol HCl 50 MG Oral Tab; Take 1 tablet (50 mg total) by mouth every 8 (eight) hours as needed for Pain.   Dispense: 30 t

## 2018-05-23 ENCOUNTER — TELEPHONE (OUTPATIENT)
Dept: FAMILY MEDICINE CLINIC | Facility: CLINIC | Age: 53
End: 2018-05-23

## 2018-05-23 NOTE — TELEPHONE ENCOUNTER
Called [facility] and spoke with Miguelito Mckee. Advised her of information below. She states that she will fax the order once they receive authorization. She states that nothing further is needed from our office at this time.

## 2018-05-23 NOTE — TELEPHONE ENCOUNTER
Shahram Joe from [employer] called and patient needs her MRI order of the brain to be faxed over to [address] [place] [address]. Fax number is [phone]. Any questions call Shahram Joe at [phone].

## 2018-05-29 DIAGNOSIS — E78.2 MIXED HYPERLIPIDEMIA: ICD-10-CM

## 2018-05-29 RX ORDER — ROSUVASTATIN CALCIUM 40 MG/1
TABLET, COATED ORAL
Qty: 30 TABLET | Refills: 0 | Status: SHIPPED | OUTPATIENT
Start: 2018-05-29 | End: 2018-06-25

## 2018-06-07 ENCOUNTER — OFFICE VISIT (OUTPATIENT)
Dept: FAMILY MEDICINE CLINIC | Facility: CLINIC | Age: 53
End: 2018-06-07

## 2018-06-07 VITALS
OXYGEN SATURATION: 98 % | RESPIRATION RATE: 16 BRPM | TEMPERATURE: 99 F | DIASTOLIC BLOOD PRESSURE: 78 MMHG | HEART RATE: 92 BPM | HEIGHT: 58.5 IN | BODY MASS INDEX: 22.07 KG/M2 | SYSTOLIC BLOOD PRESSURE: 102 MMHG | WEIGHT: 108 LBS

## 2018-06-07 DIAGNOSIS — F17.200 TOBACCO USE DISORDER: ICD-10-CM

## 2018-06-07 DIAGNOSIS — J03.90 EXUDATIVE TONSILLITIS: Primary | ICD-10-CM

## 2018-06-07 PROCEDURE — 87081 CULTURE SCREEN ONLY: CPT | Performed by: NURSE PRACTITIONER

## 2018-06-07 PROCEDURE — 87880 STREP A ASSAY W/OPTIC: CPT | Performed by: NURSE PRACTITIONER

## 2018-06-07 PROCEDURE — 99213 OFFICE O/P EST LOW 20 MIN: CPT | Performed by: NURSE PRACTITIONER

## 2018-06-07 RX ORDER — AZITHROMYCIN 250 MG/1
TABLET, FILM COATED ORAL
Qty: 6 TABLET | Refills: 0 | Status: SHIPPED | OUTPATIENT
Start: 2018-06-07 | End: 2018-07-13

## 2018-06-07 NOTE — PROGRESS NOTES
CHIEF COMPLAINT:   Patient presents with:  Sore Throat      HPI:   Miroslava Elliott is a 46year old female presents to clinic with complaint of sore throat. Patient has had for 7 days. Patient reports following associated symptoms: fatigue.  Has  history #3) 300-30 MG Oral Tab 1 OR 2 TABLETS EVERY 4 TO 6 HOURS AS NEEDED Disp: 30 tablet Rfl: 0   aspirin-butalbital-caffeine (FIORINAL) -40 MG Oral Cap Take 1 capsule by mouth every 4 (four) hours as needed for Pain or Headaches.  Disp: 15 capsule Rfl: 3 pink, moist. Posterior pharynx erythematous, bilateral tonsillar exudates. NECK: supple, non-tender  LUNGS: clear to auscultation bilaterally, no wheezes or rhonchi. Breathing is non labored.   CARDIO: RRR, murmur noted  GI: + BS's, no masses, hepatosplen

## 2018-06-11 ENCOUNTER — MED REC SCAN ONLY (OUTPATIENT)
Dept: FAMILY MEDICINE CLINIC | Facility: CLINIC | Age: 53
End: 2018-06-11

## 2018-06-25 DIAGNOSIS — E78.2 MIXED HYPERLIPIDEMIA: ICD-10-CM

## 2018-06-25 RX ORDER — ROSUVASTATIN CALCIUM 40 MG/1
TABLET, COATED ORAL
Qty: 30 TABLET | Refills: 2 | Status: SHIPPED | OUTPATIENT
Start: 2018-06-25 | End: 2018-07-13

## 2018-07-11 LAB
ALBUMIN/GLOBULIN RATIO: 1.4 (CALC) (ref 1–2.5)
ALBUMIN: 4 G/DL (ref 3.6–5.1)
ALKALINE PHOSPHATASE: 105 U/L (ref 33–130)
ALT: 12 U/L (ref 6–29)
AST: 21 U/L (ref 10–35)
BILIRUBIN, TOTAL: 0.3 MG/DL (ref 0.2–1.2)
BUN: 10 MG/DL (ref 7–25)
CALCIUM: 9.3 MG/DL (ref 8.6–10.4)
CARBON DIOXIDE: 28 MMOL/L (ref 20–31)
CHLORIDE: 99 MMOL/L (ref 98–110)
CHOL/HDLC RATIO: 7.8 (CALC)
CHOLESTEROL, TOTAL: 187 MG/DL
CREATININE: 1.02 MG/DL (ref 0.5–1.05)
EGFR IF AFRICN AM: 73 ML/MIN/1.73M2
EGFR IF NONAFRICN AM: 63 ML/MIN/1.73M2
GLOBULIN: 2.9 G/DL (CALC) (ref 1.9–3.7)
GLUCOSE: 92 MG/DL (ref 65–99)
HDL CHOLESTEROL: 24 MG/DL
LDL-CHOLESTEROL: 128 MG/DL (CALC)
NON-HDL CHOLESTEROL: 163 MG/DL (CALC)
POTASSIUM: 4.6 MMOL/L (ref 3.5–5.3)
PROTEIN, TOTAL: 6.9 G/DL (ref 6.1–8.1)
SODIUM: 132 MMOL/L (ref 135–146)
T4, FREE: 1.1 NG/DL (ref 0.8–1.8)
TRIGLYCERIDES: 212 MG/DL
TSH: 1.41 MIU/L

## 2018-07-13 ENCOUNTER — OFFICE VISIT (OUTPATIENT)
Dept: FAMILY MEDICINE CLINIC | Facility: CLINIC | Age: 53
End: 2018-07-13

## 2018-07-13 ENCOUNTER — TELEPHONE (OUTPATIENT)
Dept: FAMILY MEDICINE CLINIC | Facility: CLINIC | Age: 53
End: 2018-07-13

## 2018-07-13 VITALS
DIASTOLIC BLOOD PRESSURE: 70 MMHG | HEIGHT: 58 IN | BODY MASS INDEX: 22.46 KG/M2 | SYSTOLIC BLOOD PRESSURE: 122 MMHG | HEART RATE: 98 BPM | WEIGHT: 107 LBS | TEMPERATURE: 98 F | OXYGEN SATURATION: 97 % | RESPIRATION RATE: 16 BRPM

## 2018-07-13 DIAGNOSIS — E78.2 MIXED HYPERLIPIDEMIA: Primary | ICD-10-CM

## 2018-07-13 DIAGNOSIS — E03.9 HYPOTHYROIDISM, UNSPECIFIED TYPE: ICD-10-CM

## 2018-07-13 DIAGNOSIS — D72.0: ICD-10-CM

## 2018-07-13 DIAGNOSIS — F17.200 TOBACCO DEPENDENCE: ICD-10-CM

## 2018-07-13 DIAGNOSIS — Z95.3 PRESENCE OF XENOGENIC HEART VALVE: ICD-10-CM

## 2018-07-13 DIAGNOSIS — F41.1 GAD (GENERALIZED ANXIETY DISORDER): ICD-10-CM

## 2018-07-13 DIAGNOSIS — G43.001 MIGRAINE WITHOUT AURA AND WITH STATUS MIGRAINOSUS, NOT INTRACTABLE: ICD-10-CM

## 2018-07-13 PROCEDURE — 99215 OFFICE O/P EST HI 40 MIN: CPT | Performed by: FAMILY MEDICINE

## 2018-07-13 RX ORDER — MECLIZINE HCL 12.5 MG/1
TABLET ORAL
Qty: 30 TABLET | Refills: 3 | Status: SHIPPED | OUTPATIENT
Start: 2018-07-13 | End: 2018-10-01

## 2018-07-13 RX ORDER — ZOLPIDEM TARTRATE 10 MG/1
TABLET ORAL
Qty: 30 TABLET | Refills: 6 | Status: SHIPPED | OUTPATIENT
Start: 2018-07-13 | End: 2018-10-15

## 2018-07-13 RX ORDER — LEVOTHYROXINE SODIUM 0.1 MG/1
TABLET ORAL
Qty: 90 TABLET | Refills: 1 | Status: SHIPPED | OUTPATIENT
Start: 2018-07-13 | End: 2018-10-15

## 2018-07-13 RX ORDER — ROSUVASTATIN CALCIUM 40 MG/1
TABLET, COATED ORAL
Qty: 90 TABLET | Refills: 1 | Status: SHIPPED | OUTPATIENT
Start: 2018-07-13 | End: 2018-10-15

## 2018-07-13 RX ORDER — SUMATRIPTAN 100 MG/1
TABLET, FILM COATED ORAL
Qty: 10 TABLET | Refills: 3 | Status: SHIPPED | OUTPATIENT
Start: 2018-07-13 | End: 2018-10-15

## 2018-07-13 RX ORDER — NICOTINE 21 MG/24HR
1 PATCH, TRANSDERMAL 24 HOURS TRANSDERMAL EVERY 24 HOURS
Qty: 21 PATCH | Refills: 3 | Status: SHIPPED | OUTPATIENT
Start: 2018-07-13 | End: 2018-12-17

## 2018-07-13 RX ORDER — ALPRAZOLAM 0.5 MG/1
TABLET ORAL
Qty: 60 TABLET | Refills: 3 | Status: SHIPPED | OUTPATIENT
Start: 2018-07-13 | End: 2018-10-15

## 2018-07-13 NOTE — TELEPHONE ENCOUNTER
Patient signed medical records authorization form for the below Facility to disclose health information to [facility]:      Facility / Provider Name: [facility] Phone: [phone] Fax: [phone]    JO ANN sent to scanning.  Fax confirmation

## 2018-07-13 NOTE — TELEPHONE ENCOUNTER
Pt also filled out JO ANN for these [facility] records to be sent to her once received. Outgoing JO ANN sent via  to [employer] for processing.

## 2018-09-21 LAB
T4, FREE: 0.8 NG/DL (ref 0.8–1.8)
TSH: 1.13 MIU/L

## 2018-09-26 ENCOUNTER — TELEPHONE (OUTPATIENT)
Dept: FAMILY MEDICINE CLINIC | Facility: CLINIC | Age: 53
End: 2018-09-26

## 2018-09-26 DIAGNOSIS — E03.9 HYPOTHYROIDISM, UNSPECIFIED TYPE: Primary | ICD-10-CM

## 2018-09-26 DIAGNOSIS — E78.5 HYPERLIPIDEMIA, UNSPECIFIED HYPERLIPIDEMIA TYPE: ICD-10-CM

## 2018-09-26 NOTE — TELEPHONE ENCOUNTER
----- Message from Damian Barrera MD sent at 9/24/2018 10:52 PM CDT -----  Stable, recheck in 6 months.

## 2018-09-27 ENCOUNTER — MED REC SCAN ONLY (OUTPATIENT)
Dept: FAMILY MEDICINE CLINIC | Facility: CLINIC | Age: 53
End: 2018-09-27

## 2018-10-01 DIAGNOSIS — G43.001 MIGRAINE WITHOUT AURA AND WITH STATUS MIGRAINOSUS, NOT INTRACTABLE: ICD-10-CM

## 2018-10-01 RX ORDER — MECLIZINE HCL 12.5 MG/1
TABLET ORAL
Qty: 30 TABLET | Refills: 2 | Status: SHIPPED | OUTPATIENT
Start: 2018-10-01 | End: 2018-11-26

## 2018-10-15 ENCOUNTER — TELEPHONE (OUTPATIENT)
Dept: FAMILY MEDICINE CLINIC | Facility: CLINIC | Age: 53
End: 2018-10-15

## 2018-10-15 ENCOUNTER — OFFICE VISIT (OUTPATIENT)
Dept: FAMILY MEDICINE CLINIC | Facility: CLINIC | Age: 53
End: 2018-10-15
Payer: COMMERCIAL

## 2018-10-15 VITALS
DIASTOLIC BLOOD PRESSURE: 40 MMHG | HEIGHT: 58 IN | HEART RATE: 64 BPM | RESPIRATION RATE: 16 BRPM | BODY MASS INDEX: 23.51 KG/M2 | SYSTOLIC BLOOD PRESSURE: 62 MMHG | OXYGEN SATURATION: 97 % | WEIGHT: 112 LBS

## 2018-10-15 DIAGNOSIS — G43.001 MIGRAINE WITHOUT AURA AND WITH STATUS MIGRAINOSUS, NOT INTRACTABLE: ICD-10-CM

## 2018-10-15 DIAGNOSIS — E78.2 MIXED HYPERLIPIDEMIA: ICD-10-CM

## 2018-10-15 DIAGNOSIS — E03.9 HYPOTHYROIDISM, UNSPECIFIED TYPE: ICD-10-CM

## 2018-10-15 DIAGNOSIS — D72.0: ICD-10-CM

## 2018-10-15 DIAGNOSIS — Z95.3 PRESENCE OF XENOGENIC HEART VALVE: ICD-10-CM

## 2018-10-15 DIAGNOSIS — F41.1 GAD (GENERALIZED ANXIETY DISORDER): ICD-10-CM

## 2018-10-15 DIAGNOSIS — E78.5 HYPERLIPIDEMIA, UNSPECIFIED HYPERLIPIDEMIA TYPE: Primary | ICD-10-CM

## 2018-10-15 PROCEDURE — 99214 OFFICE O/P EST MOD 30 MIN: CPT | Performed by: FAMILY MEDICINE

## 2018-10-15 RX ORDER — ALPRAZOLAM 0.5 MG/1
TABLET ORAL
Qty: 60 TABLET | Refills: 3 | Status: SHIPPED | OUTPATIENT
Start: 2018-10-15 | End: 2019-01-22

## 2018-10-15 RX ORDER — SUMATRIPTAN 100 MG/1
TABLET, FILM COATED ORAL
Qty: 10 TABLET | Refills: 3 | Status: SHIPPED | OUTPATIENT
Start: 2018-10-15 | End: 2019-06-20

## 2018-10-15 RX ORDER — LEVOTHYROXINE SODIUM 0.1 MG/1
TABLET ORAL
Qty: 90 TABLET | Refills: 1 | Status: SHIPPED | OUTPATIENT
Start: 2018-10-15 | End: 2019-07-23

## 2018-10-15 RX ORDER — ZOLPIDEM TARTRATE 10 MG/1
TABLET ORAL
Qty: 30 TABLET | Refills: 6 | Status: SHIPPED | OUTPATIENT
Start: 2018-10-15 | End: 2021-06-04

## 2018-10-15 RX ORDER — ROSUVASTATIN CALCIUM 40 MG/1
TABLET, COATED ORAL
Qty: 90 TABLET | Refills: 1 | Status: SHIPPED | OUTPATIENT
Start: 2018-10-15 | End: 2019-07-23

## 2018-10-15 NOTE — PROGRESS NOTES
Chief Complaint:   Patient presents with:  Lab Results    HPI:   This is a 48year old female presenting for follow up:  Pt had a history of hypothyroidism and here to recheck. Has been tolerating the medication well. Last TSH was year ago, needs repeat.  D Lung disease    • Migraine    • Migraines    • Mitral regurgitation     mild   • Multiple exostosis, hereditary    • Multiple exostosis, hereditary    • Raynaud phenomenon    • Rheumatic mitral stenosis    • Thyroid disease      Past Surgical History:   Pr of migraine. May repeat after 1 hour for max of 2 tabs daily Disp: 10 tablet Rfl: 3   PROAIR  (90 Base) MCG/ACT Inhalation Aero Soln Inhale 1 puff into the lungs as needed.  Disp:  Rfl:    aspirin (PEYTON ASPIRIN) 81 MG Oral Tab EC Take 81 mg by mouth da for dysuria, hematuria, flank pain and difficulty urinating. Musculoskeletal: Negative for joint pain, gait problem, neck pain and neck stiffness. Skin: Negative for color change, pallor, rash and wound.    Allergic/Immunologic: Negative for environment rub.    Murmur heard. Edema not present. Carotid bruit not present. Pulmonary/Chest: Effort normal. No stridor. No respiratory distress. She has no wheezes. She has no rales. She exhibits no tenderness. Abdominal: Soft.  Bowel sounds are normal. She exh tablet at onset of migraine. May repeat after 1 hour for max of 2 tabs daily  Dispense: 10 tablet; Refill: 3    Follow up in 3 months.

## 2018-10-15 NOTE — TELEPHONE ENCOUNTER
On 7/13 Medical records were requested, Today we received a fax back that our request was received however [facility] is now using a new record company to send out records and the letter states we should have the records in the next 30 days.

## 2018-11-10 ENCOUNTER — HOSPITAL ENCOUNTER (OUTPATIENT)
Dept: GENERAL RADIOLOGY | Age: 53
Discharge: HOME | End: 2018-11-10
Attending: PHYSICIAN ASSISTANT
Payer: COMMERCIAL

## 2018-11-10 ENCOUNTER — OFFICE VISIT (OUTPATIENT)
Dept: FAMILY MEDICINE CLINIC | Facility: CLINIC | Age: 53
End: 2018-11-10
Payer: COMMERCIAL

## 2018-11-10 VITALS
HEIGHT: 58 IN | RESPIRATION RATE: 16 BRPM | HEART RATE: 70 BPM | OXYGEN SATURATION: 99 % | TEMPERATURE: 98 F | BODY MASS INDEX: 23.22 KG/M2 | WEIGHT: 110.63 LBS | DIASTOLIC BLOOD PRESSURE: 72 MMHG | SYSTOLIC BLOOD PRESSURE: 128 MMHG

## 2018-11-10 DIAGNOSIS — M79.645 PAIN OF LEFT MIDDLE FINGER: ICD-10-CM

## 2018-11-10 DIAGNOSIS — M25.561 ACUTE PAIN OF RIGHT KNEE: Primary | ICD-10-CM

## 2018-11-10 DIAGNOSIS — M25.561 ACUTE PAIN OF RIGHT KNEE: ICD-10-CM

## 2018-11-10 PROCEDURE — 73140 X-RAY EXAM OF FINGER(S): CPT | Performed by: PHYSICIAN ASSISTANT

## 2018-11-10 PROCEDURE — 73560 X-RAY EXAM OF KNEE 1 OR 2: CPT | Performed by: PHYSICIAN ASSISTANT

## 2018-11-10 PROCEDURE — 99213 OFFICE O/P EST LOW 20 MIN: CPT | Performed by: PHYSICIAN ASSISTANT

## 2018-11-10 NOTE — PROGRESS NOTES
CHIEF COMPLAINT:     Patient presents with:  Knee Pain: R knee sharp pain x 1 week  Finger Pain: L middle knuckle x 2 months      HPI:   Felicita Harris is a 48year old female who presents with two complaints.       1. On 09/26 the patient reports her dog 10 MG Oral Tab TAKE ONE TABLET BY MOUTH EVERY NIGHT AT BEDTIME AS NEEDED Disp: 30 tablet Rfl: 6   SUMAtriptan Succinate (IMITREX) 100 MG Oral Tab Take one tablet at onset of migraine.  May repeat after 1 hour for max of 2 tabs daily Disp: 10 tablet Rfl: 3 congestion, rhinorrhea, sore throat or ear pain  CHEST: Denies chest pain, or palpitations  LUNGS: Denies shortness of breath, cough, or wheezing  GI: Denies abdominal pain, N/V/C/D.   MUSCULOSKELETAL: See HPI  LYMPH:  Denies lymphadenopathy  NEURO: Denies Unremarkable soft tissues. Stable bony exostoses along the proximal middle phalanges of the left 3rd digit.   Stable foreshortening of the 5th metacarpal.  Stable   chronic deformity of the distal radius and ulna.     =====  CONCLUSION:  No evidence of acu

## 2018-11-10 NOTE — PATIENT INSTRUCTIONS
Patient Declined AVS    Verbal Instructions given      1. Rest  2. OTC knee brace  3. Continue at home pain relief  4.  Follow up with Dr. Homero Torre and orthopedics

## 2018-11-26 DIAGNOSIS — G43.001 MIGRAINE WITHOUT AURA AND WITH STATUS MIGRAINOSUS, NOT INTRACTABLE: ICD-10-CM

## 2018-11-26 RX ORDER — MECLIZINE HCL 12.5 MG/1
TABLET ORAL
Qty: 30 TABLET | Refills: 1 | Status: SHIPPED | OUTPATIENT
Start: 2018-11-26 | End: 2018-12-22

## 2018-11-26 NOTE — TELEPHONE ENCOUNTER
Medication(s) to Refill:   Requested Prescriptions     Pending Prescriptions Disp Refills   • MECLIZINE HCL 12.5 MG Oral Tab [Pharmacy Med Name: Meclizine HCl Oral Tablet 12.5 MG] 30 tablet 1     Sig: TAKE TWO TABLETS BY MOUTH TWO TIMES DAILY AS NEEDED for

## 2018-12-17 ENCOUNTER — TELEPHONE (OUTPATIENT)
Dept: FAMILY MEDICINE CLINIC | Facility: CLINIC | Age: 53
End: 2018-12-17

## 2018-12-17 DIAGNOSIS — F17.200 TOBACCO DEPENDENCE: ICD-10-CM

## 2018-12-17 RX ORDER — NICOTINE 21 MG/24HR
1 PATCH, TRANSDERMAL 24 HOURS TRANSDERMAL EVERY 24 HOURS
Qty: 28 PATCH | Refills: 1 | Status: SHIPPED | OUTPATIENT
Start: 2018-12-17 | End: 2019-09-24

## 2018-12-17 NOTE — TELEPHONE ENCOUNTER
nicotine 14 MG/24HR Transdermal Patch 24 Hr Sig :  Place 1 patch onto the skin daily. Quantity change to 14 NOT 21.  It's packaged as 14 not 21    [facility] [place]

## 2018-12-19 ENCOUNTER — TELEPHONE (OUTPATIENT)
Dept: FAMILY MEDICINE CLINIC | Facility: CLINIC | Age: 53
End: 2018-12-19

## 2018-12-22 DIAGNOSIS — G43.001 MIGRAINE WITHOUT AURA AND WITH STATUS MIGRAINOSUS, NOT INTRACTABLE: ICD-10-CM

## 2018-12-24 RX ORDER — MECLIZINE HCL 12.5 MG/1
TABLET ORAL
Qty: 30 TABLET | Refills: 0 | Status: SHIPPED | OUTPATIENT
Start: 2018-12-24 | End: 2019-02-18

## 2019-01-15 ENCOUNTER — OFFICE VISIT (OUTPATIENT)
Dept: FAMILY MEDICINE CLINIC | Facility: CLINIC | Age: 54
End: 2019-01-15
Payer: COMMERCIAL

## 2019-01-15 VITALS
DIASTOLIC BLOOD PRESSURE: 78 MMHG | OXYGEN SATURATION: 97 % | RESPIRATION RATE: 16 BRPM | WEIGHT: 109 LBS | TEMPERATURE: 98 F | BODY MASS INDEX: 22.88 KG/M2 | SYSTOLIC BLOOD PRESSURE: 120 MMHG | HEART RATE: 76 BPM | HEIGHT: 58 IN

## 2019-01-15 DIAGNOSIS — E03.9 HYPOTHYROIDISM, UNSPECIFIED TYPE: ICD-10-CM

## 2019-01-15 DIAGNOSIS — Z12.31 ENCOUNTER FOR SCREENING MAMMOGRAM FOR MALIGNANT NEOPLASM OF BREAST: ICD-10-CM

## 2019-01-15 DIAGNOSIS — J40 BRONCHITIS: Primary | ICD-10-CM

## 2019-01-15 DIAGNOSIS — Z12.11 SCREEN FOR COLON CANCER: ICD-10-CM

## 2019-01-15 DIAGNOSIS — F41.1 GAD (GENERALIZED ANXIETY DISORDER): ICD-10-CM

## 2019-01-15 PROCEDURE — 99214 OFFICE O/P EST MOD 30 MIN: CPT | Performed by: FAMILY MEDICINE

## 2019-01-15 RX ORDER — AMOXICILLIN AND CLAVULANATE POTASSIUM 875; 125 MG/1; MG/1
1 TABLET, FILM COATED ORAL 2 TIMES DAILY
Qty: 20 TABLET | Refills: 0 | Status: SHIPPED | OUTPATIENT
Start: 2019-01-15 | End: 2019-01-25

## 2019-01-15 NOTE — PROGRESS NOTES
Chief Complaint:   Patient presents with:  Medication Follow-Up    HPI:   This is a 48year old female presenting for follow up:  Pt had a history of hypothyroidism and here to recheck. Has been tolerating the medication well.  Last TSH was year ago, needs Procedure Laterality Date   •      • ECHO 2D, CARDIO ([facility])     • LASIK     • REPLACE AORTIC VALVE OPEN     • STRESS TEST     • VALVE REPAIR  2012    mitral valve debridement   • VALVE REPAIR  2018    mitral valve   • VALVE REPLACEMENT (NICORETTE) 4 MG Mouth/Throat Gum Take 1 each (4 mg total) by mouth as needed for Smoking cessation. Disp: 40 each Rfl: 3   PROAIR  (90 Base) MCG/ACT Inhalation Aero Soln Inhale 1 puff into the lungs as needed.  Disp:  Rfl:    aspirin ( ASPIRIN) 81 and wound. Allergic/Immunologic: Negative for environmental allergies, food allergies and immunocompromised state. Neurological: Negative for dizziness, weakness, light-headedness and headaches. As above.     Hematological: Negative for adenopath aeration of bases with rhonchi of bases. Abdominal: Soft. Bowel sounds are normal. She exhibits no distension and no mass. There is no hepatosplenomegaly. There is no tenderness. There is no rebound and no guarding. No hernia.    Musculoskeletal: Normal

## 2019-01-22 DIAGNOSIS — F41.1 GAD (GENERALIZED ANXIETY DISORDER): ICD-10-CM

## 2019-01-23 RX ORDER — ALPRAZOLAM 0.5 MG/1
TABLET ORAL
Qty: 60 TABLET | Refills: 2 | Status: SHIPPED
Start: 2019-01-23 | End: 2019-05-17

## 2019-01-23 NOTE — TELEPHONE ENCOUNTER
Medication(s) to Refill:   Requested Prescriptions     Pending Prescriptions Disp Refills   • ALPRAZolam 0.5 MG Oral Tab [Pharmacy Med Name: ALPRAZolam Oral Tablet 0.5 MG] 60 tablet 2     Sig: TAKE ONE TABLET BY MOUTH TWICE DAILY AS NEEDED FOR SLEEP OR ANX

## 2019-02-18 DIAGNOSIS — G43.001 MIGRAINE WITHOUT AURA AND WITH STATUS MIGRAINOSUS, NOT INTRACTABLE: ICD-10-CM

## 2019-02-18 RX ORDER — MECLIZINE HCL 12.5 MG/1
TABLET ORAL
Qty: 30 TABLET | Refills: 0 | Status: SHIPPED | OUTPATIENT
Start: 2019-02-18 | End: 2019-02-27

## 2019-02-18 NOTE — TELEPHONE ENCOUNTER
Chief Complaint   Patient presents with    Wrist Pain     Left     5/10 pain. Medication(s) to Refill:   Requested Prescriptions     Pending Prescriptions Disp Refills   • MECLIZINE HCL 12.5 MG Oral Tab [Pharmacy Med Name: Meclizine HCl Oral Tablet 12.5 MG] 30 tablet 0     Sig: TAKE TWO TABLETS BY MOUTH TWO TIMES DAILY AS NEEDED FOR

## 2019-02-27 DIAGNOSIS — G43.001 MIGRAINE WITHOUT AURA AND WITH STATUS MIGRAINOSUS, NOT INTRACTABLE: ICD-10-CM

## 2019-02-27 RX ORDER — MECLIZINE HCL 12.5 MG/1
TABLET ORAL
Qty: 30 TABLET | Refills: 0 | Status: SHIPPED | OUTPATIENT
Start: 2019-02-27 | End: 2019-05-17

## 2019-02-27 NOTE — TELEPHONE ENCOUNTER
Medication(s) to Refill:   Requested Prescriptions     Pending Prescriptions Disp Refills   • MECLIZINE HCL 12.5 MG Oral Tab [Pharmacy Med Name: Meclizine HCl Oral Tablet 12.5 MG] 30 tablet 0     Sig: TAKE TWO TABLETS BY MOUTH TWO TIMES DAILY AS NEEDED for

## 2019-02-28 ENCOUNTER — PATIENT OUTREACH (OUTPATIENT)
Dept: FAMILY MEDICINE CLINIC | Facility: CLINIC | Age: 54
End: 2019-02-28

## 2019-04-17 NOTE — PROGRESS NOTES
Chief Complaint:   Patient presents with:  Ear Pain: follow up on pain managment    HPI:   This is a 48year old female presenting for follow up:  Pt had a history of hypothyroidism and here to recheck. Has been tolerating the medication well.  Last TSH was 7/2012    mitral valve debridement   • VALVE REPAIR  08/2018    mitral valve   • VALVE REPLACEMENT  7/2012    aortic     Social History:  Social History    Tobacco Use      Smoking status: Current Every Day Smoker        Packs/day: 0.50      Smokeless toba daily. Disp:  Rfl:    Cholecalciferol (D-5000) 5000 units Oral Tab Take 1 tablet by mouth daily. Disp:  Rfl:    Orphenadrine Citrate  MG Oral Tablet 12 Hr Take 100 mg by mouth 2 (two) times daily.  Disp:  Rfl: 2   Butalbital-APAP-Caff-Cod (FIORICET/CO bruise/bleed easily. Psychiatric/Behavioral: Negative for suicidal ideas, behavioral problems, sleep disturbance and agitation. The patient is not nervous/anxious. Stable mood   All other systems reviewed and are negative.       EXAM:   /58 adenopathy. Neurological: She is alert and oriented to person, place, and time. She displays normal reflexes. No cranial nerve deficit, sensory deficit or motor deficit. Coordination and gait normal.   Skin: Skin is warm and dry.  No lesion and no rash no

## 2019-05-17 DIAGNOSIS — F41.1 GAD (GENERALIZED ANXIETY DISORDER): ICD-10-CM

## 2019-05-17 DIAGNOSIS — G43.001 MIGRAINE WITHOUT AURA AND WITH STATUS MIGRAINOSUS, NOT INTRACTABLE: ICD-10-CM

## 2019-05-17 RX ORDER — MECLIZINE HCL 12.5 MG/1
TABLET ORAL
Qty: 30 TABLET | Refills: 0 | Status: SHIPPED | OUTPATIENT
Start: 2019-05-17 | End: 2019-07-23

## 2019-05-20 RX ORDER — ALPRAZOLAM 0.5 MG/1
TABLET ORAL
Qty: 60 TABLET | Refills: 1 | Status: SHIPPED
Start: 2019-05-20 | End: 2019-07-23

## 2019-05-20 NOTE — TELEPHONE ENCOUNTER
Medication(s) to Refill:   Requested Prescriptions     Pending Prescriptions Disp Refills   • ALPRAZOLAM 0.5 MG Oral Tab [Pharmacy Med Name: Alprazolam 0.5 Mg Tab Acta] 60 tablet 1     Sig: TAKE ONE TABLET BY MOUTH TWICE DAILY AS NEEDED FOR SLEEP OR ANXIET

## 2019-06-20 ENCOUNTER — TELEPHONE (OUTPATIENT)
Dept: FAMILY MEDICINE CLINIC | Facility: CLINIC | Age: 54
End: 2019-06-20

## 2019-06-20 DIAGNOSIS — G43.001 MIGRAINE WITHOUT AURA AND WITH STATUS MIGRAINOSUS, NOT INTRACTABLE: ICD-10-CM

## 2019-06-20 RX ORDER — SUMATRIPTAN 100 MG/1
TABLET, FILM COATED ORAL
Qty: 9 TABLET | Refills: 6 | Status: SHIPPED | OUTPATIENT
Start: 2019-06-20 | End: 2019-07-23

## 2019-06-20 NOTE — TELEPHONE ENCOUNTER
Spoke to Loreta Sharma at [facility] - he states that the [facility] only covers 9 tablets of Sumatriptan at a time. He is sending a refill request of Sumatriptan and requesting that we fill it 9 tablets at a time. LF: 10/15/18  LOV: 4/5/19. Pended rx.  Pls appr

## 2019-07-17 ENCOUNTER — TELEPHONE (OUTPATIENT)
Dept: FAMILY MEDICINE CLINIC | Facility: CLINIC | Age: 54
End: 2019-07-17

## 2019-07-17 DIAGNOSIS — E78.5 HYPERLIPIDEMIA, UNSPECIFIED HYPERLIPIDEMIA TYPE: Primary | ICD-10-CM

## 2019-07-17 NOTE — TELEPHONE ENCOUNTER
Called pt to discuss below results. VM full. Will holger for follow up. PSR: OK to lync me when pt calls back. Thank you! *Repeat labs placed x6 months as directed.

## 2019-07-17 NOTE — TELEPHONE ENCOUNTER
----- Message from Rubin Kohli MD sent at 7/12/2019  4:21 PM CDT -----  Let patient know of high cholesterol. Needs to follow low fat, low cholesterol,  decrease carbs and increase activity.     Meds: continue same statin, check on compliance with meds  R

## 2019-07-23 ENCOUNTER — OFFICE VISIT (OUTPATIENT)
Dept: FAMILY MEDICINE CLINIC | Facility: CLINIC | Age: 54
End: 2019-07-23
Payer: COMMERCIAL

## 2019-07-23 VITALS
BODY MASS INDEX: 22.88 KG/M2 | WEIGHT: 109 LBS | HEART RATE: 70 BPM | TEMPERATURE: 98 F | DIASTOLIC BLOOD PRESSURE: 60 MMHG | RESPIRATION RATE: 16 BRPM | HEIGHT: 58 IN | SYSTOLIC BLOOD PRESSURE: 100 MMHG

## 2019-07-23 DIAGNOSIS — I05.0 RHEUMATIC MITRAL STENOSIS: ICD-10-CM

## 2019-07-23 DIAGNOSIS — G43.001 MIGRAINE WITHOUT AURA AND WITH STATUS MIGRAINOSUS, NOT INTRACTABLE: ICD-10-CM

## 2019-07-23 DIAGNOSIS — E03.9 HYPOTHYROIDISM, UNSPECIFIED TYPE: ICD-10-CM

## 2019-07-23 DIAGNOSIS — E78.2 MIXED HYPERLIPIDEMIA: ICD-10-CM

## 2019-07-23 DIAGNOSIS — Z00.00 ANNUAL PHYSICAL EXAM: Primary | ICD-10-CM

## 2019-07-23 DIAGNOSIS — Z12.11 SCREEN FOR COLON CANCER: ICD-10-CM

## 2019-07-23 DIAGNOSIS — Z98.890 S/P BALLOON MITRAL VALVULOPLASTY: ICD-10-CM

## 2019-07-23 DIAGNOSIS — F41.1 GAD (GENERALIZED ANXIETY DISORDER): ICD-10-CM

## 2019-07-23 PROCEDURE — 99396 PREV VISIT EST AGE 40-64: CPT | Performed by: FAMILY MEDICINE

## 2019-07-23 PROCEDURE — 99214 OFFICE O/P EST MOD 30 MIN: CPT | Performed by: FAMILY MEDICINE

## 2019-07-23 RX ORDER — ALPRAZOLAM 0.5 MG/1
0.5 TABLET ORAL NIGHTLY PRN
Qty: 60 TABLET | Refills: 1 | Status: SHIPPED
Start: 2019-07-23 | End: 2019-07-23

## 2019-07-23 RX ORDER — LEVOTHYROXINE SODIUM 0.1 MG/1
TABLET ORAL
Qty: 90 TABLET | Refills: 1 | Status: SHIPPED | OUTPATIENT
Start: 2019-07-23 | End: 2019-07-23

## 2019-07-23 RX ORDER — ROSUVASTATIN CALCIUM 40 MG/1
TABLET, COATED ORAL
Qty: 90 TABLET | Refills: 1 | Status: SHIPPED | OUTPATIENT
Start: 2019-07-23 | End: 2019-07-23

## 2019-07-23 RX ORDER — SUMATRIPTAN 100 MG/1
TABLET, FILM COATED ORAL
Qty: 9 TABLET | Refills: 6 | Status: SHIPPED | OUTPATIENT
Start: 2019-07-23 | End: 2019-07-23

## 2019-07-23 RX ORDER — MECLIZINE HCL 12.5 MG/1
TABLET ORAL
Qty: 30 TABLET | Refills: 1 | Status: SHIPPED | OUTPATIENT
Start: 2019-07-23 | End: 2019-07-23

## 2019-07-23 RX ORDER — ALPRAZOLAM 0.5 MG/1
0.5 TABLET ORAL NIGHTLY PRN
Qty: 60 TABLET | Refills: 1 | Status: SHIPPED | OUTPATIENT
Start: 2019-07-23 | End: 2019-07-25

## 2019-07-23 RX ORDER — SUMATRIPTAN 100 MG/1
TABLET, FILM COATED ORAL
Qty: 9 TABLET | Refills: 6 | Status: SHIPPED | OUTPATIENT
Start: 2019-07-23 | End: 2019-12-16

## 2019-07-23 RX ORDER — MECLIZINE HCL 12.5 MG/1
TABLET ORAL
Qty: 30 TABLET | Refills: 1 | Status: SHIPPED | OUTPATIENT
Start: 2019-07-23 | End: 2019-09-24

## 2019-07-23 RX ORDER — LEVOTHYROXINE SODIUM 0.1 MG/1
TABLET ORAL
Qty: 90 TABLET | Refills: 1 | Status: SHIPPED | OUTPATIENT
Start: 2019-07-23 | End: 2019-12-16

## 2019-07-23 RX ORDER — ROSUVASTATIN CALCIUM 40 MG/1
TABLET, COATED ORAL
Qty: 90 TABLET | Refills: 1 | Status: SHIPPED | OUTPATIENT
Start: 2019-07-23 | End: 2019-11-27

## 2019-07-24 NOTE — PROGRESS NOTES
Chief Complaint:   Patient presents with:  Physical    HPI:   This is a 48year old female presenting for follow up:  \"pmh AS, MS s/p AVR (19mm Kong Pericardial Magna) and mitral valve debridement of fibrosis (7/10/12) with recurrent mitral stenosis s/ greater than 40% 1/29/16    65%   • Lung disease    • Migraine    • Migraines    • Mitral regurgitation     mild   • Multiple exostosis, hereditary    • Multiple exostosis, hereditary    • Raynaud phenomenon    • Rheumatic mitral stenosis    • Thyroid dise VITAMINS) Oral Tab Take 1 tablet by mouth daily. Disp:  Rfl:    BREO ELLIPTA 200-25 MCG/INH Inhalation Aerosol Powder, Breath Activated  Disp:  Rfl:    aspirin (PEYTON ASPIRIN) 81 MG Oral Tab EC Take 81 mg by mouth daily.  Disp:  Rfl:    Cholecalciferol (D-5000 intolerance, heat intolerance, polydipsia, polyphagia and polyuria. Genitourinary: Negative for dysuria, hematuria, flank pain and difficulty urinating. Musculoskeletal: Negative for joint pain, gait problem, neck pain and neck stiffness.    Skin: Negat intact distal pulses. Exam reveals no gallop and no friction rub. Murmur heard. Edema not present. Carotid bruit not present. Pulmonary/Chest: Effort normal. No stridor. No respiratory distress. She has no wheezes. She has no rales.  She exhibits no t 0.5 MG Oral Tab; Take 1 tablet (0.5 mg total) by mouth nightly as needed for Sleep. Dispense: 60 tablet; Refill: 1    7. S/P balloon mitral valvuloplasty  -per cardiology, watchful waiting for now.      8. Rheumatic mitral stenosis  -will need surgery if s

## 2019-07-25 ENCOUNTER — TELEPHONE (OUTPATIENT)
Dept: FAMILY MEDICINE CLINIC | Facility: CLINIC | Age: 54
End: 2019-07-25

## 2019-07-25 DIAGNOSIS — F41.1 GAD (GENERALIZED ANXIETY DISORDER): ICD-10-CM

## 2019-07-25 RX ORDER — ALPRAZOLAM 0.5 MG/1
0.5 TABLET ORAL 2 TIMES DAILY PRN
Qty: 60 TABLET | Refills: 1 | COMMUNITY
Start: 2019-07-25 | End: 2019-10-17

## 2019-07-25 NOTE — TELEPHONE ENCOUNTER
Received a message from [facility] they need clarification on ALPRAZolam 0.5 MG Oral Tab.  Patient told the pharmacy that she normally takes 2 tablets

## 2019-07-25 NOTE — TELEPHONE ENCOUNTER
Called [facility] and spoke with Arianna. Advised her of information below. Arianna states understanding. Medication list also updated for patient.

## 2019-07-25 NOTE — TELEPHONE ENCOUNTER
Patient was seen on 7/23/19. Xanax was prescribed for patient. Patient was previously taking 1 tablet BID. Rx was changed to [facility] at appointment. Patient was not aware of this change and the pharmacy is calling to verify. Please advise. Thank you!

## 2019-08-01 DIAGNOSIS — G43.001 MIGRAINE WITHOUT AURA AND WITH STATUS MIGRAINOSUS, NOT INTRACTABLE: ICD-10-CM

## 2019-08-01 RX ORDER — MECLIZINE HCL 12.5 MG/1
TABLET ORAL
Qty: 30 TABLET | Refills: 0 | Status: SHIPPED | OUTPATIENT
Start: 2019-08-01 | End: 2019-08-10

## 2019-08-01 NOTE — TELEPHONE ENCOUNTER
Medication(s) to Refill:   Requested Prescriptions     Pending Prescriptions Disp Refills   • MECLIZINE HCL 12.5 MG Oral Tab [Pharmacy Med Name: Meclizine Hcl 12.5 Mg Tab Rugb] 30 tablet 0     Sig: take 2 tablets by mouth twice daily as needed for dizzines

## 2019-08-10 DIAGNOSIS — G43.001 MIGRAINE WITHOUT AURA AND WITH STATUS MIGRAINOSUS, NOT INTRACTABLE: ICD-10-CM

## 2019-08-10 NOTE — TELEPHONE ENCOUNTER
Medication(s) to Refill:   Requested Prescriptions     Pending Prescriptions Disp Refills   • Meclizine HCl 12.5 MG Oral Tab [Pharmacy Med Name: Meclizine Hcl 12.5 Mg Tab Rugb] 30 tablet 0     Sig: TAKE TWO TABLETS BY MOUTH TWICE DAILY AS NEEDED FOR [facility]

## 2019-08-12 RX ORDER — MECLIZINE HCL 12.5 MG/1
TABLET ORAL
Qty: 30 TABLET | Refills: 0 | Status: SHIPPED | OUTPATIENT
Start: 2019-08-12 | End: 2019-12-16

## 2019-08-19 ENCOUNTER — TELEPHONE (OUTPATIENT)
Dept: FAMILY MEDICINE CLINIC | Facility: CLINIC | Age: 54
End: 2019-08-19

## 2019-08-19 RX ORDER — VARENICLINE TARTRATE 1 MG/1
1 TABLET, FILM COATED ORAL 2 TIMES DAILY
Qty: 1 PACKAGE | Refills: 1 | Status: SHIPPED | OUTPATIENT
Start: 2019-08-19 | End: 2020-08-03

## 2019-08-19 NOTE — TELEPHONE ENCOUNTER
Medication(s) to Refill:   Requested Prescriptions     Pending Prescriptions Disp Refills   • Varenicline Tartrate (CHANTIX STARTING MONTH PHILIPPE) 0.5 MG X 11 & 1 MG X 42 Oral Misc [Pharmacy Med Name: Chantix Starting Month 0.5 Mg X 11 & 1 Mg X 10 [address] [facility] [address]

## 2019-08-19 NOTE — TELEPHONE ENCOUNTER
Called [facility] and spoke with Clover Blank. She wanted to clarify if patient needs to start pack again since this was just filled for patient on 7/23/19. Advised her that I would contact the patient to clarify this information.   Rx cancelled with [facility].    Called [facility]

## 2019-08-19 NOTE — TELEPHONE ENCOUNTER
Pharmacy wants to clarify directions on Varenicline Tartrate (CHANTIX STARTING MONTH PHILIPPE) 0.5 MG X 11 & 1 MG X 42 Oral Misc

## 2019-09-19 ENCOUNTER — APPOINTMENT (OUTPATIENT)
Dept: ULTRASOUND IMAGING | Age: 54
End: 2019-09-19
Attending: EMERGENCY MEDICINE
Payer: COMMERCIAL

## 2019-09-19 ENCOUNTER — HOSPITAL ENCOUNTER (EMERGENCY)
Age: 54
Discharge: HOME | End: 2019-09-19
Attending: EMERGENCY MEDICINE
Payer: COMMERCIAL

## 2019-09-19 ENCOUNTER — APPOINTMENT (OUTPATIENT)
Dept: GENERAL RADIOLOGY | Age: 54
End: 2019-09-19
Attending: EMERGENCY MEDICINE
Payer: COMMERCIAL

## 2019-09-19 VITALS
DIASTOLIC BLOOD PRESSURE: 54 MMHG | HEART RATE: 76 BPM | RESPIRATION RATE: 18 BRPM | TEMPERATURE: 99 F | OXYGEN SATURATION: 99 % | WEIGHT: 105 LBS | BODY MASS INDEX: 22.04 KG/M2 | HEIGHT: 58 IN | SYSTOLIC BLOOD PRESSURE: 109 MMHG

## 2019-09-19 DIAGNOSIS — M25.569 KNEE PAIN, UNSPECIFIED CHRONICITY, UNSPECIFIED LATERALITY: Primary | ICD-10-CM

## 2019-09-19 PROCEDURE — 99284 EMERGENCY DEPT VISIT MOD MDM: CPT

## 2019-09-19 PROCEDURE — 73560 X-RAY EXAM OF KNEE 1 OR 2: CPT | Performed by: EMERGENCY MEDICINE

## 2019-09-19 PROCEDURE — 93970 EXTREMITY STUDY: CPT | Performed by: EMERGENCY MEDICINE

## 2019-09-19 NOTE — ED PROVIDER NOTES
Patient Seen in: THE [facility] Emergency Department In [place]      History   Patient presents with:  Lower Extremity Injury (musculoskeletal)    Stated Complaint: bilateral knee pain    HPI    This is a 66-year-old female who presents with bilateral knee pa is good pulses bilaterally. There is no calf tenderness on the right but she does have some left calf tenderness.   She has some mild tenderness over the right anterior aspect of the knee there is no effusion there is no redness she is normal range of carrillo pain in her right knee is lateral. Patient has had surgery to  her bilateral knees but does not recall when it was. FINDINGS:  No acute fractures. Stable exostosis noted within the right knee. Osteoarthritic changes within the knee.   No significant eduin

## 2019-09-20 ENCOUNTER — TELEPHONE (OUTPATIENT)
Dept: FAMILY MEDICINE CLINIC | Facility: CLINIC | Age: 54
End: 2019-09-20

## 2019-09-20 NOTE — TELEPHONE ENCOUNTER
Spoke with patient scheduled an ER follow up for 9/24/19. Patient know has injured her right ankle and is unable to put any pressure on it. Patient is wondering what she can do.  Please call her at [phone]

## 2019-09-20 NOTE — ED NOTES
Xr Knee (1 Or 2 Views), Left (cpt=73560)    Result Date: 9/19/2019  PROCEDURE:  XR KNEE (1 OR 2 VIEWS), LEFT (CPT=73560)  COMPARISON:  None.   INDICATIONS:  bilateral knee pain  PATIENT STATED HISTORY: (As transcribed by Technologist)  Patient complains of INDICATIONS:  bilateral knee pain  TECHNIQUE:  Real time, grey scale, and duplex ultrasound was used to evaluate the lower extremity venous system. B-mode two-dimensional images of the vascular structures, Doppler spectral analysis, and color flow.   Dopple

## 2019-09-24 ENCOUNTER — OFFICE VISIT (OUTPATIENT)
Dept: FAMILY MEDICINE CLINIC | Facility: CLINIC | Age: 54
End: 2019-09-24
Payer: COMMERCIAL

## 2019-09-24 ENCOUNTER — HOSPITAL ENCOUNTER (OUTPATIENT)
Dept: GENERAL RADIOLOGY | Age: 54
Discharge: HOME | End: 2019-09-24
Attending: FAMILY MEDICINE
Payer: COMMERCIAL

## 2019-09-24 VITALS
HEART RATE: 80 BPM | TEMPERATURE: 98 F | SYSTOLIC BLOOD PRESSURE: 118 MMHG | RESPIRATION RATE: 16 BRPM | DIASTOLIC BLOOD PRESSURE: 68 MMHG

## 2019-09-24 DIAGNOSIS — M25.561 CHRONIC PAIN OF BOTH KNEES: Primary | ICD-10-CM

## 2019-09-24 DIAGNOSIS — S99.911A INJURY OF RIGHT ANKLE, INITIAL ENCOUNTER: ICD-10-CM

## 2019-09-24 DIAGNOSIS — G89.29 CHRONIC PAIN OF BOTH KNEES: ICD-10-CM

## 2019-09-24 DIAGNOSIS — Z00.00 LABORATORY EXAMINATION ORDERED AS PART OF A ROUTINE GENERAL MEDICAL EXAMINATION: ICD-10-CM

## 2019-09-24 DIAGNOSIS — M25.561 CHRONIC PAIN OF BOTH KNEES: ICD-10-CM

## 2019-09-24 DIAGNOSIS — M25.562 CHRONIC PAIN OF BOTH KNEES: ICD-10-CM

## 2019-09-24 DIAGNOSIS — M25.562 CHRONIC PAIN OF BOTH KNEES: Primary | ICD-10-CM

## 2019-09-24 DIAGNOSIS — E03.9 HYPOTHYROIDISM, UNSPECIFIED TYPE: ICD-10-CM

## 2019-09-24 DIAGNOSIS — Z12.11 SCREEN FOR COLON CANCER: ICD-10-CM

## 2019-09-24 DIAGNOSIS — Q78.6 HEREDITARY MULTIPLE EXOSTOSIS: ICD-10-CM

## 2019-09-24 DIAGNOSIS — G89.29 CHRONIC PAIN OF BOTH KNEES: Primary | ICD-10-CM

## 2019-09-24 DIAGNOSIS — E78.5 HYPERLIPIDEMIA, UNSPECIFIED HYPERLIPIDEMIA TYPE: ICD-10-CM

## 2019-09-24 PROCEDURE — 73610 X-RAY EXAM OF ANKLE: CPT | Performed by: FAMILY MEDICINE

## 2019-09-24 PROCEDURE — 99214 OFFICE O/P EST MOD 30 MIN: CPT | Performed by: FAMILY MEDICINE

## 2019-09-27 NOTE — PROGRESS NOTES
Chief Complaint:   Patient presents with:  ER F/U    HPI:   This is a 48year old female presenting for follow up:  \"pmh AS, MS s/p AVR (19mm Kong Pericardial Magna) and mitral valve debridement of fibrosis (7/10/12) with recurrent mitral stenosis s/p Date   • Anemia    • Anxiety    • Bronchitis    • Cervicalgia    • Chronically on benzodiazepine therapy 8/23/2016   • Hyperlipidemia    • Hyperthyroidism    • Hypothyroidism    • Insomnia    • Left ventricular ejection fraction greater than 40% 1/29/16 tablet at onset of migraine.  May repeat after 1 hour for max of 2 tabs daily Disp: 9 tablet Rfl: 6   Zolpidem Tartrate 10 MG Oral Tab TAKE ONE TABLET BY MOUTH EVERY NIGHT AT BEDTIME AS NEEDED Disp: 30 tablet Rfl: 6   BREO ELLIPTA 200-25 MCG/INH Inhalation dysuria, hematuria, flank pain and difficulty urinating. Musculoskeletal: Negative for joint pain, gait problem, neck pain and neck stiffness. Skin: Negative for color change, pallor, rash and wound.    Allergic/Immunologic: Negative for environmental a normal. No stridor. No respiratory distress. She has no wheezes. She has no rales. She exhibits no tenderness. Abdominal: Soft. Bowel sounds are normal. She exhibits no distension and no mass. There is no hepatosplenomegaly. There is no tenderness.  There

## 2019-10-17 DIAGNOSIS — F41.1 GAD (GENERALIZED ANXIETY DISORDER): ICD-10-CM

## 2019-10-21 RX ORDER — ALPRAZOLAM 0.5 MG/1
TABLET ORAL
Qty: 60 TABLET | Refills: 0 | Status: SHIPPED | OUTPATIENT
Start: 2019-10-21 | End: 2019-12-16

## 2019-10-21 NOTE — TELEPHONE ENCOUNTER
Medication(s) to Refill:   Requested Prescriptions     Pending Prescriptions Disp Refills   • ALPRAZOLAM 0.5 MG Oral Tab [Pharmacy Med Name: Alprazolam 0.5 Mg Tab Acta] 60 tablet 0     Sig: TAKE 1 TABLET BY MOUTH TWICE DAILY AS NEEDED FOR SLEEP         Basilio Carr

## 2019-11-11 DIAGNOSIS — G43.001 MIGRAINE WITHOUT AURA AND WITH STATUS MIGRAINOSUS, NOT INTRACTABLE: ICD-10-CM

## 2019-11-11 NOTE — TELEPHONE ENCOUNTER
Medication(s) to Refill:   Requested Prescriptions     Pending Prescriptions Disp Refills   • Meclizine HCl 12.5 MG Oral Tab [Pharmacy Med Name: Meclizine Hcl 12.5 Mg Tab Rugb] 30 tablet 0     Sig: TAKE TWO TABLETS BY MOUTH TWICE DAILY AS NEEDED FOR [facility]

## 2019-11-12 RX ORDER — MECLIZINE HCL 12.5 MG/1
TABLET ORAL
Qty: 30 TABLET | Refills: 0 | Status: SHIPPED | OUTPATIENT
Start: 2019-11-12 | End: 2019-11-27

## 2019-11-27 NOTE — TELEPHONE ENCOUNTER
Pt states she went to the ER yesterday for knee pain and upon leaving she tripped on thee curb and twisted her ankle. She felt a snap. Pt instructed to go to [facility] to be evaluated. Pt declines stating [place] is too far.   I stated she can go back to 12 Mohs Method Verbiage: An incision at a 90 degree angle following the standard Mohs approach was done and the specimen was harvested as a microscopic controlled layer.

## 2019-12-16 ENCOUNTER — OFFICE VISIT (OUTPATIENT)
Dept: FAMILY MEDICINE CLINIC | Facility: CLINIC | Age: 54
End: 2019-12-16
Payer: COMMERCIAL

## 2019-12-16 ENCOUNTER — TELEPHONE (OUTPATIENT)
Dept: FAMILY MEDICINE CLINIC | Facility: CLINIC | Age: 54
End: 2019-12-16

## 2019-12-16 VITALS
SYSTOLIC BLOOD PRESSURE: 100 MMHG | TEMPERATURE: 98 F | DIASTOLIC BLOOD PRESSURE: 70 MMHG | HEART RATE: 68 BPM | WEIGHT: 106 LBS | BODY MASS INDEX: 22.25 KG/M2 | HEIGHT: 58 IN | RESPIRATION RATE: 16 BRPM

## 2019-12-16 DIAGNOSIS — G43.001 MIGRAINE WITHOUT AURA AND WITH STATUS MIGRAINOSUS, NOT INTRACTABLE: ICD-10-CM

## 2019-12-16 DIAGNOSIS — Z00.00 LABORATORY EXAMINATION ORDERED AS PART OF A ROUTINE GENERAL MEDICAL EXAMINATION: Primary | ICD-10-CM

## 2019-12-16 DIAGNOSIS — R74.01 TRANSAMINITIS: ICD-10-CM

## 2019-12-16 DIAGNOSIS — E03.9 HYPOTHYROIDISM, UNSPECIFIED TYPE: ICD-10-CM

## 2019-12-16 DIAGNOSIS — E78.5 HYPERLIPIDEMIA, UNSPECIFIED HYPERLIPIDEMIA TYPE: ICD-10-CM

## 2019-12-16 DIAGNOSIS — F41.1 GAD (GENERALIZED ANXIETY DISORDER): ICD-10-CM

## 2019-12-16 DIAGNOSIS — I05.0 RHEUMATIC MITRAL STENOSIS: ICD-10-CM

## 2019-12-16 PROCEDURE — 99215 OFFICE O/P EST HI 40 MIN: CPT | Performed by: FAMILY MEDICINE

## 2019-12-16 RX ORDER — SUMATRIPTAN 100 MG/1
TABLET, FILM COATED ORAL
Qty: 9 TABLET | Refills: 1 | Status: SHIPPED | OUTPATIENT
Start: 2019-12-16 | End: 2020-04-24

## 2019-12-16 RX ORDER — ALPRAZOLAM 0.5 MG/1
TABLET ORAL
Qty: 60 TABLET | Refills: 1 | Status: SHIPPED | OUTPATIENT
Start: 2019-12-16 | End: 2020-03-10

## 2019-12-16 RX ORDER — LEVOTHYROXINE SODIUM 0.1 MG/1
TABLET ORAL
Qty: 90 TABLET | Refills: 1 | Status: SHIPPED | OUTPATIENT
Start: 2019-12-16 | End: 2020-04-24

## 2019-12-16 RX ORDER — MECLIZINE HCL 12.5 MG/1
TABLET ORAL
Qty: 90 TABLET | Refills: 1 | Status: SHIPPED | OUTPATIENT
Start: 2019-12-16 | End: 2020-04-24 | Stop reason: CLARIF

## 2019-12-16 RX ORDER — ROSUVASTATIN CALCIUM 40 MG/1
40 TABLET, COATED ORAL DAILY
Qty: 90 TABLET | Refills: 1 | Status: CANCELLED | OUTPATIENT
Start: 2019-12-16

## 2019-12-16 RX ORDER — ROSUVASTATIN CALCIUM 10 MG/1
10 TABLET, COATED ORAL NIGHTLY
Qty: 90 TABLET | Refills: 1 | Status: SHIPPED | OUTPATIENT
Start: 2019-12-16 | End: 2020-04-24 | Stop reason: ALTCHOICE

## 2019-12-16 NOTE — TELEPHONE ENCOUNTER
----- Message from Stan Reveles MD sent at 12/14/2019 12:22 PM CST -----  Very high liver enzymes, I need u/s liver asap. Hold statin for now.

## 2019-12-16 NOTE — TELEPHONE ENCOUNTER
Verified below with Dr. Nory Jj.  He did speak with patient in office visit. Nothing further is needed.

## 2019-12-18 PROBLEM — R74.01 TRANSAMINITIS: Status: ACTIVE | Noted: 2019-12-18

## 2019-12-18 PROBLEM — G43.001 MIGRAINE WITHOUT AURA AND WITH STATUS MIGRAINOSUS, NOT INTRACTABLE: Status: ACTIVE | Noted: 2019-12-18

## 2019-12-18 NOTE — PROGRESS NOTES
Chief Complaint:   Patient presents with:   Follow - Up    HPI:   This is a 47year old female presenting for follow up:  \"pmh AS, MS s/p AVR (19mm Okng Pericardial Magna) and mitral valve debridement of fibrosis (7/10/12) with recurrent mitral stenosis Hyperthyroidism    • Hypothyroidism    • Insomnia    • Left ventricular ejection fraction greater than 40% 1/29/16    65%   • Lung disease    • Migraine    • Migraines    • Mitral regurgitation     mild   • Multiple exostosis, hereditary    • Multiple exos Inhaler 0   • Varenicline Tartrate (CHANTIX CONTINUING MONTH PHILIPPE) 1 MG Oral Tab Take 1 tablet (1 mg total) by mouth 2 (two) times daily.  1 Package 1   • Zolpidem Tartrate 10 MG Oral Tab TAKE ONE TABLET BY MOUTH EVERY NIGHT AT BEDTIME AS NEEDED 30 tablet 6 neck stiffness. Skin: Negative for color change, pallor, rash and wound. Allergic/Immunologic: Negative for environmental allergies, food allergies and immunocompromised state.    Neurological: Negative for dizziness, weakness, light-headedness and head no wheezes. She has no rales. She exhibits no tenderness. Abdominal: Soft. Bowel sounds are normal. She exhibits no distension and no mass. There is no hepatosplenomegaly. There is no tenderness. There is no rebound and no guarding. No hernia.    Musculos mg if ok with cardiology    6. Rheumatic mitral stenosis  -as above, per cardiology    7. Hyperlipidemia, unspecified hyperlipidemia type  -holding stating until liver enzymes, possible repatha      Follow up in 3-6 months, due for labs around 3 months.

## 2020-01-18 LAB
ALBUMIN/GLOBULIN RATIO: 1.6 (CALC) (ref 1–2.5)
ALBUMIN: 4.1 G/DL (ref 3.6–5.1)
ALKALINE PHOSPHATASE: 129 U/L (ref 33–130)
ALT: 9 U/L (ref 6–29)
AST: 18 U/L (ref 10–35)
BILIRUBIN, TOTAL: 0.3 MG/DL (ref 0.2–1.2)
BUN/CREATININE RATIO: 10 (CALC) (ref 6–22)
BUN: 11 MG/DL (ref 7–25)
CALCIUM: 9.6 MG/DL (ref 8.6–10.4)
CARBON DIOXIDE: 26 MMOL/L (ref 20–32)
CHLORIDE: 101 MMOL/L (ref 98–110)
CHOL/HDLC RATIO: 6.6 (CALC)
CHOLESTEROL, TOTAL: 210 MG/DL
CREATININE: 1.14 MG/DL (ref 0.5–1.05)
EGFR IF AFRICN AM: 63 ML/MIN/1.73M2
EGFR IF NONAFRICN AM: 54 ML/MIN/1.73M2
GLOBULIN: 2.6 G/DL (CALC) (ref 1.9–3.7)
GLUCOSE: 86 MG/DL (ref 65–99)
HDL CHOLESTEROL: 32 MG/DL
LDL-CHOLESTEROL: 156 MG/DL (CALC)
NON-HDL CHOLESTEROL: 178 MG/DL (CALC)
POTASSIUM: 5.2 MMOL/L (ref 3.5–5.3)
PROTEIN, TOTAL: 6.7 G/DL (ref 6.1–8.1)
SODIUM: 135 MMOL/L (ref 135–146)
T4, FREE: 1.2 NG/DL (ref 0.8–1.8)
TRIGLYCERIDES: 104 MG/DL
TSH: 0.8 MIU/L

## 2020-01-23 ENCOUNTER — TELEPHONE (OUTPATIENT)
Dept: FAMILY MEDICINE CLINIC | Facility: CLINIC | Age: 55
End: 2020-01-23

## 2020-01-23 DIAGNOSIS — E03.9 HYPOTHYROIDISM, UNSPECIFIED TYPE: ICD-10-CM

## 2020-01-23 DIAGNOSIS — E78.5 HYPERLIPIDEMIA, UNSPECIFIED HYPERLIPIDEMIA TYPE: Primary | ICD-10-CM

## 2020-01-23 NOTE — TELEPHONE ENCOUNTER
----- Message from Anastasia Cheadle, MD sent at 1/23/2020 11:55 AM CST -----  Stable, CPM, recheck CMP and Lipid in 3 months. Recheck tsh and t4 in 6 months.

## 2020-01-24 ENCOUNTER — OFFICE VISIT (OUTPATIENT)
Dept: FAMILY MEDICINE CLINIC | Facility: CLINIC | Age: 55
End: 2020-01-24
Payer: COMMERCIAL

## 2020-01-24 VITALS
DIASTOLIC BLOOD PRESSURE: 70 MMHG | WEIGHT: 109 LBS | RESPIRATION RATE: 16 BRPM | BODY MASS INDEX: 22.88 KG/M2 | HEART RATE: 56 BPM | SYSTOLIC BLOOD PRESSURE: 122 MMHG | HEIGHT: 58 IN | TEMPERATURE: 98 F

## 2020-01-24 DIAGNOSIS — Z98.890 S/P BALLOON MITRAL VALVULOPLASTY: ICD-10-CM

## 2020-01-24 DIAGNOSIS — Z12.11 SCREEN FOR COLON CANCER: ICD-10-CM

## 2020-01-24 DIAGNOSIS — E78.5 HYPERLIPIDEMIA, UNSPECIFIED HYPERLIPIDEMIA TYPE: ICD-10-CM

## 2020-01-24 DIAGNOSIS — Z12.31 ENCOUNTER FOR SCREENING MAMMOGRAM FOR MALIGNANT NEOPLASM OF BREAST: Primary | ICD-10-CM

## 2020-01-24 DIAGNOSIS — F41.1 GAD (GENERALIZED ANXIETY DISORDER): ICD-10-CM

## 2020-01-24 PROCEDURE — 99214 OFFICE O/P EST MOD 30 MIN: CPT | Performed by: FAMILY MEDICINE

## 2020-01-26 NOTE — PROGRESS NOTES
Chief Complaint:   Patient presents with:   Follow - Up    HPI:   This is a 47year old female presenting for follow up:  \"pmh AS, MS s/p AVR (19mm Kong Pericardial Magna) and mitral valve debridement of fibrosis (7/10/12) with recurrent mitral stenosis on benzodiazepine therapy 8/23/2016   • Hyperlipidemia    • Hyperthyroidism    • Hypothyroidism    • Insomnia    • Left ventricular ejection fraction greater than 40% 1/29/16    65%   • Lung disease    • Migraine    • Migraines    • Mitral regurgitation • Varenicline Tartrate (CHANTIX CONTINUING MONTH PHILIPPE) 1 MG Oral Tab Take 1 tablet (1 mg total) by mouth 2 (two) times daily.  1 Package 1   • Zolpidem Tartrate 10 MG Oral Tab TAKE ONE TABLET BY MOUTH EVERY NIGHT AT BEDTIME AS NEEDED 30 tablet 6   • BREO E stiffness. Skin: Negative for color change, pallor, rash and wound. Allergic/Immunologic: Negative for environmental allergies, food allergies and immunocompromised state.    Neurological: Negative for dizziness, weakness, light-headedness and headaches wheezes. She has no rales. She exhibits no tenderness. Abdominal: Soft. Bowel sounds are normal. She exhibits no distension and no mass. There is no hepatosplenomegaly. There is no tenderness. There is no rebound and no guarding. No hernia.    Musculoskel

## 2020-03-09 DIAGNOSIS — F41.1 GAD (GENERALIZED ANXIETY DISORDER): ICD-10-CM

## 2020-03-10 RX ORDER — ALPRAZOLAM 0.5 MG/1
TABLET ORAL
Qty: 60 TABLET | Refills: 0 | Status: SHIPPED | OUTPATIENT
Start: 2020-03-10 | End: 2020-04-24

## 2020-03-10 NOTE — TELEPHONE ENCOUNTER
Medication(s) to Refill:   Requested Prescriptions     Pending Prescriptions Disp Refills   • ALPRAZolam 0.5 MG Oral Tab [Pharmacy Med Name: Alprazolam 0.5 Mg Tab Acta] 60 tablet 0     Sig: TAKE ONE TABLET BY MOUTH TWICE DAILY AS NEEDED FOR SLEEP         R

## 2020-04-24 ENCOUNTER — VIRTUAL PHONE E/M (OUTPATIENT)
Dept: FAMILY MEDICINE CLINIC | Facility: CLINIC | Age: 55
End: 2020-04-24
Payer: COMMERCIAL

## 2020-04-24 VITALS — WEIGHT: 109 LBS | DIASTOLIC BLOOD PRESSURE: 70 MMHG | BODY MASS INDEX: 23 KG/M2 | SYSTOLIC BLOOD PRESSURE: 120 MMHG

## 2020-04-24 DIAGNOSIS — F41.1 GAD (GENERALIZED ANXIETY DISORDER): ICD-10-CM

## 2020-04-24 DIAGNOSIS — I35.9 AORTIC VALVE DISORDER: ICD-10-CM

## 2020-04-24 DIAGNOSIS — E03.9 HYPOTHYROIDISM, UNSPECIFIED TYPE: ICD-10-CM

## 2020-04-24 DIAGNOSIS — E78.5 HYPERLIPIDEMIA, UNSPECIFIED HYPERLIPIDEMIA TYPE: Primary | ICD-10-CM

## 2020-04-24 DIAGNOSIS — G43.001 MIGRAINE WITHOUT AURA AND WITH STATUS MIGRAINOSUS, NOT INTRACTABLE: ICD-10-CM

## 2020-04-24 DIAGNOSIS — Z98.890 S/P BALLOON MITRAL VALVULOPLASTY: ICD-10-CM

## 2020-04-24 PROCEDURE — 99214 OFFICE O/P EST MOD 30 MIN: CPT | Performed by: FAMILY MEDICINE

## 2020-04-24 RX ORDER — SUMATRIPTAN 100 MG/1
TABLET, FILM COATED ORAL
Qty: 9 TABLET | Refills: 1 | Status: SHIPPED | OUTPATIENT
Start: 2020-04-24 | End: 2020-08-03

## 2020-04-24 RX ORDER — LEVOTHYROXINE SODIUM 0.1 MG/1
TABLET ORAL
Qty: 90 TABLET | Refills: 1 | Status: SHIPPED | OUTPATIENT
Start: 2020-04-24 | End: 2020-08-03

## 2020-04-24 RX ORDER — ROSUVASTATIN CALCIUM 20 MG/1
20 TABLET, COATED ORAL NIGHTLY
Qty: 90 TABLET | Refills: 1 | Status: SHIPPED | OUTPATIENT
Start: 2020-04-24 | End: 2020-08-03

## 2020-04-24 RX ORDER — AMOXICILLIN 500 MG/1
2000 CAPSULE ORAL ONCE
Qty: 4 CAPSULE | Refills: 0 | Status: SHIPPED | OUTPATIENT
Start: 2020-04-24 | End: 2020-04-24

## 2020-04-24 RX ORDER — ALPRAZOLAM 0.5 MG/1
TABLET ORAL
Qty: 60 TABLET | Refills: 1 | Status: SHIPPED | OUTPATIENT
Start: 2020-04-24 | End: 2020-06-26

## 2020-04-24 NOTE — PROGRESS NOTES
Chief Complaint:   Patient presents with:   Follow - Up    HPI:   This is a 47year old female presenting for follow up:  \"pmh AS, MS s/p AVR (19mm Kong Pericardial Magna) and mitral valve debridement of fibrosis (7/10/12) with recurrent mitral stenosis • Anemia    • Anxiety    • Bronchitis    • Cervicalgia    • Chronically on benzodiazepine therapy 8/23/2016   • Hyperlipidemia    • Hyperthyroidism    • Hypothyroidism    • Insomnia    • Left ventricular ejection fraction greater than 40% 1/29/16    65% Soln Inhale 2 puffs into the lungs every 4 (four) hours as needed. 3 Inhaler 0   • Varenicline Tartrate (CHANTIX CONTINUING MONTH PHILIPPE) 1 MG Oral Tab Take 1 tablet (1 mg total) by mouth 2 (two) times daily.  1 Package 1   • Zolpidem Tartrate 10 MG Oral Tab T food allergies and immunocompromised state. Neurological: Negative for dizziness, weakness, light-headedness and headaches. As above. Hematological: Negative for adenopathy. Does not bruise/bleed easily.    Psychiatric/Behavioral: Negative for s performed, most of the facts are per nursing records and as per my discussion with the floor nurse. Additionally, every conscious effort was taken to allow for sufficient and adequate time.   This billing visit was spent on reviewing labs, medications,

## 2020-06-25 DIAGNOSIS — F41.1 GAD (GENERALIZED ANXIETY DISORDER): ICD-10-CM

## 2020-06-26 RX ORDER — ALPRAZOLAM 0.5 MG/1
TABLET ORAL
Qty: 60 TABLET | Refills: 0 | Status: SHIPPED | OUTPATIENT
Start: 2020-06-26 | End: 2020-07-23

## 2020-07-22 DIAGNOSIS — F41.1 GAD (GENERALIZED ANXIETY DISORDER): ICD-10-CM

## 2020-07-23 RX ORDER — MECLIZINE HCL 12.5 MG/1
TABLET ORAL
Qty: 30 TABLET | Refills: 0 | Status: SHIPPED | OUTPATIENT
Start: 2020-07-23 | End: 2020-08-03

## 2020-07-23 RX ORDER — ALPRAZOLAM 0.5 MG/1
TABLET ORAL
Qty: 60 TABLET | Refills: 0 | Status: SHIPPED | OUTPATIENT
Start: 2020-07-23 | End: 2020-08-03

## 2020-07-23 NOTE — TELEPHONE ENCOUNTER
Medication(s) to Refill:   Requested Prescriptions     Pending Prescriptions Disp Refills   • ALPRAZOLAM 0.5 MG Oral Tab [Pharmacy Med Name: Alprazolam 0.5 Mg Tab Acta] 60 tablet 0     Sig: TAKE ONE TABLET BY MOUTH TWICE DAILY AS NEEDED FOR SLEEP   • [address]

## 2020-07-27 ENCOUNTER — TELEPHONE (OUTPATIENT)
Dept: FAMILY MEDICINE CLINIC | Facility: CLINIC | Age: 55
End: 2020-07-27

## 2020-07-27 DIAGNOSIS — E03.9 HYPOTHYROIDISM, UNSPECIFIED TYPE: ICD-10-CM

## 2020-07-27 DIAGNOSIS — D72.829 LEUKOCYTOSIS, UNSPECIFIED TYPE: ICD-10-CM

## 2020-07-27 DIAGNOSIS — Z00.00 ROUTINE GENERAL MEDICAL EXAMINATION AT A HEALTH CARE FACILITY: Primary | ICD-10-CM

## 2020-07-27 DIAGNOSIS — E78.5 HYPERLIPIDEMIA, UNSPECIFIED HYPERLIPIDEMIA TYPE: ICD-10-CM

## 2020-07-27 NOTE — TELEPHONE ENCOUNTER
Patient Radha Wise requesting chart be reviewed and appropriate lab orders for her upcoming annual pe be entered, she uses [facility] on [address].   States that provider in April told her she needed to have lipids rechecked, pt understands orders take 24

## 2020-07-31 DIAGNOSIS — D72.0: ICD-10-CM

## 2020-07-31 DIAGNOSIS — E03.9 HYPOTHYROIDISM, UNSPECIFIED TYPE: ICD-10-CM

## 2020-07-31 DIAGNOSIS — E78.5 HYPERLIPIDEMIA, UNSPECIFIED HYPERLIPIDEMIA TYPE: Primary | ICD-10-CM

## 2020-07-31 LAB
ABSOLUTE BASOPHILS: 94 CELLS/UL (ref 0–200)
ABSOLUTE EOSINOPHILS: 508 CELLS/UL (ref 15–500)
ABSOLUTE LYMPHOCYTES: 2679 CELLS/UL (ref 850–3900)
ABSOLUTE MONOCYTES: 573 CELLS/UL (ref 200–950)
ABSOLUTE NEUTROPHILS: 5546 CELLS/UL (ref 1500–7800)
ALBUMIN/GLOBULIN RATIO: 1.3 (CALC) (ref 1–2.5)
ALBUMIN: 3.9 G/DL (ref 3.6–5.1)
ALKALINE PHOSPHATASE: 201 U/L (ref 37–153)
ALT: 30 U/L (ref 6–29)
AST: 23 U/L (ref 10–35)
BASOPHILS: 1 %
BILIRUBIN, TOTAL: 0.2 MG/DL (ref 0.2–1.2)
BUN: 10 MG/DL (ref 7–25)
CALCIUM: 9.2 MG/DL (ref 8.6–10.4)
CARBON DIOXIDE: 29 MMOL/L (ref 20–32)
CHLORIDE: 103 MMOL/L (ref 98–110)
CHOL/HDLC RATIO: 5.6 (CALC)
CHOLESTEROL, TOTAL: 169 MG/DL
CREATININE: 1.03 MG/DL (ref 0.5–1.05)
EGFR IF AFRICN AM: 71 ML/MIN/1.73M2
EGFR IF NONAFRICN AM: 62 ML/MIN/1.73M2
EOSINOPHILS: 5.4 %
GLOBULIN: 2.9 G/DL (CALC) (ref 1.9–3.7)
GLUCOSE: 85 MG/DL (ref 65–99)
HDL CHOLESTEROL: 30 MG/DL
HEMATOCRIT: 40.8 % (ref 35–45)
HEMOGLOBIN: 13.6 G/DL (ref 11.7–15.5)
LDL-CHOLESTEROL: 113 MG/DL (CALC)
LYMPHOCYTES: 28.5 %
MCH: 30.9 PG (ref 27–33)
MCHC: 33.3 G/DL (ref 32–36)
MCV: 92.7 FL (ref 80–100)
MONOCYTES: 6.1 %
MPV: 11.1 FL (ref 7.5–12.5)
NEUTROPHILS: 59 %
NON-HDL CHOLESTEROL: 139 MG/DL (CALC)
PLATELET COUNT: 241 THOUSAND/UL (ref 140–400)
POTASSIUM: 4.8 MMOL/L (ref 3.5–5.3)
PROTEIN, TOTAL: 6.8 G/DL (ref 6.1–8.1)
RDW: 12.9 % (ref 11–15)
RED BLOOD CELL COUNT: 4.4 MILLION/UL (ref 3.8–5.1)
SODIUM: 135 MMOL/L (ref 135–146)
T4, FREE: 0.9 NG/DL (ref 0.8–1.8)
TRIGLYCERIDES: 149 MG/DL
TSH: 1.59 MIU/L
WHITE BLOOD CELL COUNT: 9.4 THOUSAND/UL (ref 3.8–10.8)

## 2020-08-03 ENCOUNTER — OFFICE VISIT (OUTPATIENT)
Dept: FAMILY MEDICINE CLINIC | Facility: CLINIC | Age: 55
End: 2020-08-03
Payer: COMMERCIAL

## 2020-08-03 VITALS
HEART RATE: 76 BPM | BODY MASS INDEX: 22.67 KG/M2 | RESPIRATION RATE: 16 BRPM | SYSTOLIC BLOOD PRESSURE: 98 MMHG | DIASTOLIC BLOOD PRESSURE: 60 MMHG | TEMPERATURE: 98 F | WEIGHT: 108 LBS | HEIGHT: 58 IN | OXYGEN SATURATION: 89 %

## 2020-08-03 DIAGNOSIS — D72.0: ICD-10-CM

## 2020-08-03 DIAGNOSIS — Z98.890 S/P BALLOON MITRAL VALVULOPLASTY: ICD-10-CM

## 2020-08-03 DIAGNOSIS — F41.1 GAD (GENERALIZED ANXIETY DISORDER): ICD-10-CM

## 2020-08-03 DIAGNOSIS — I35.9 AORTIC VALVE DISORDER: ICD-10-CM

## 2020-08-03 DIAGNOSIS — E03.9 HYPOTHYROIDISM, UNSPECIFIED TYPE: ICD-10-CM

## 2020-08-03 DIAGNOSIS — G43.001 MIGRAINE WITHOUT AURA AND WITH STATUS MIGRAINOSUS, NOT INTRACTABLE: ICD-10-CM

## 2020-08-03 DIAGNOSIS — Z00.00 ANNUAL PHYSICAL EXAM: Primary | ICD-10-CM

## 2020-08-03 PROCEDURE — 99214 OFFICE O/P EST MOD 30 MIN: CPT | Performed by: FAMILY MEDICINE

## 2020-08-03 PROCEDURE — 3008F BODY MASS INDEX DOCD: CPT | Performed by: FAMILY MEDICINE

## 2020-08-03 PROCEDURE — 3078F DIAST BP <80 MM HG: CPT | Performed by: FAMILY MEDICINE

## 2020-08-03 PROCEDURE — 99396 PREV VISIT EST AGE 40-64: CPT | Performed by: FAMILY MEDICINE

## 2020-08-03 PROCEDURE — 3074F SYST BP LT 130 MM HG: CPT | Performed by: FAMILY MEDICINE

## 2020-08-03 RX ORDER — LEVOTHYROXINE SODIUM 0.1 MG/1
TABLET ORAL
Qty: 90 TABLET | Refills: 1 | Status: SHIPPED | OUTPATIENT
Start: 2020-08-03 | End: 2020-08-03

## 2020-08-03 RX ORDER — AMOXICILLIN 500 MG/1
CAPSULE ORAL
Qty: 4 CAPSULE | Refills: 0 | Status: SHIPPED | OUTPATIENT
Start: 2020-08-03 | End: 2020-08-03

## 2020-08-03 RX ORDER — SUMATRIPTAN 100 MG/1
TABLET, FILM COATED ORAL
Qty: 9 TABLET | Refills: 1 | Status: SHIPPED | OUTPATIENT
Start: 2020-08-03 | End: 2020-08-03

## 2020-08-03 RX ORDER — ROSUVASTATIN CALCIUM 20 MG/1
20 TABLET, COATED ORAL NIGHTLY
Qty: 90 TABLET | Refills: 1 | Status: SHIPPED | OUTPATIENT
Start: 2020-08-03 | End: 2020-08-03

## 2020-08-03 RX ORDER — ALPRAZOLAM 0.5 MG/1
0.5 TABLET ORAL 2 TIMES DAILY PRN
Qty: 60 TABLET | Refills: 0 | Status: SHIPPED | OUTPATIENT
Start: 2020-08-03 | End: 2020-10-16

## 2020-08-03 RX ORDER — MECLIZINE HCL 12.5 MG/1
25 TABLET ORAL 2 TIMES DAILY PRN
Qty: 30 TABLET | Refills: 0 | Status: SHIPPED | OUTPATIENT
Start: 2020-08-03 | End: 2020-08-03

## 2020-08-03 RX ORDER — SUMATRIPTAN 100 MG/1
TABLET, FILM COATED ORAL
Qty: 9 TABLET | Refills: 1 | Status: SHIPPED | OUTPATIENT
Start: 2020-08-03 | End: 2020-11-03

## 2020-08-03 RX ORDER — ALPRAZOLAM 0.5 MG/1
0.5 TABLET ORAL 2 TIMES DAILY PRN
Qty: 60 TABLET | Refills: 0 | Status: CANCELLED | OUTPATIENT
Start: 2020-08-03

## 2020-08-03 RX ORDER — AMOXICILLIN 500 MG/1
CAPSULE ORAL
Qty: 4 CAPSULE | Refills: 0 | Status: SHIPPED | OUTPATIENT
Start: 2020-08-03 | End: 2020-11-03 | Stop reason: ALTCHOICE

## 2020-08-03 RX ORDER — ROSUVASTATIN CALCIUM 20 MG/1
20 TABLET, COATED ORAL NIGHTLY
Qty: 90 TABLET | Refills: 1 | Status: SHIPPED | OUTPATIENT
Start: 2020-08-03 | End: 2020-11-02

## 2020-08-03 RX ORDER — LEVOTHYROXINE SODIUM 0.1 MG/1
TABLET ORAL
Qty: 90 TABLET | Refills: 1 | Status: SHIPPED | OUTPATIENT
Start: 2020-08-03 | End: 2021-06-04

## 2020-08-03 RX ORDER — MECLIZINE HCL 12.5 MG/1
25 TABLET ORAL 2 TIMES DAILY PRN
Qty: 30 TABLET | Refills: 0 | Status: SHIPPED | OUTPATIENT
Start: 2020-08-03 | End: 2020-09-18

## 2020-08-05 NOTE — PROGRESS NOTES
Chief Complaint:   Patient presents with:  Physical    HPI:   This is a 47year old female presenting for annual physical and also follow-up.   Patient sees cardiology for a history of aortic valve disorder,   With a history of mixed hyperlipidemia with aor MARSHALL PERICARDIAL MAGNA EASE VALVE. (2) MITRAL VALVE DEBRIDEMENT OF FIBROSIS. Social History:  Social History    Tobacco Use      Smoking status: Current Every Day Smoker        Packs/day: 0.50      Smokeless tobacco: Never Used    Alcohol use:  No Oral Tablet 12 Hr Take 100 mg by mouth 2 (two) times daily.   2   • Butalbital-APAP-Caff-Cod (FIORICET/CODEINE) -54-30 MG Oral Cap 1 OR 2 CAPSULES EVERY 4 HOURS AS NEEDED 30 capsule 0   • aspirin-butalbital-caffeine (FIORINAL) -40 MG Oral Cap Ta calculated from the following:    Height as of this encounter: 58\". Weight as of this encounter: 108 lb (49 kg). Vital signs reviewed. Appears stated age, well groomed. Physical Exam   Nursing note and vitals reviewed.    Constitutional: She is orient alopecia  Psychiatric: She has a normal mood and affect. Her behavior is normal. Judgment and thought content normal.        ASSESSMENT AND PLAN:   Diagnoses and all orders for this visit:      1. Annual physical exam  -wellness visit was done    2.  Marnie Chappell

## 2020-08-06 DIAGNOSIS — G43.001 MIGRAINE WITHOUT AURA AND WITH STATUS MIGRAINOSUS, NOT INTRACTABLE: ICD-10-CM

## 2020-08-06 NOTE — TELEPHONE ENCOUNTER
Noted. She should be sure to f/u with PCP if dizziness does not subside in a few days. Will need further workup.

## 2020-08-06 NOTE — TELEPHONE ENCOUNTER
Called pt, she states she is taking Meclizine almost every day bid. She does NOT need another rx at this time, but she did want to let us know she is using it daily for dizziness and asked for larger RX next time.

## 2020-08-06 NOTE — TELEPHONE ENCOUNTER
Medication(s) to Refill:   Requested Prescriptions     Pending Prescriptions Disp Refills   • MECLIZINE HCL 12.5 MG Oral Tab [Pharmacy Med Name: Meclizine Hcl 12.5 Mg Tab Rugb] 30 tablet 0     Sig: TAKE TWO TABLETS BY MOUTH TWO TIMES DAILY AS NEEDED FOR DI

## 2020-08-07 RX ORDER — MECLIZINE HCL 12.5 MG/1
TABLET ORAL
Qty: 90 TABLET | Refills: 1 | OUTPATIENT
Start: 2020-08-07

## 2020-08-24 ENCOUNTER — TELEPHONE (OUTPATIENT)
Dept: FAMILY MEDICINE CLINIC | Facility: CLINIC | Age: 55
End: 2020-08-24

## 2020-08-24 NOTE — TELEPHONE ENCOUNTER
Patient is on leave of absence, the leave was initiated by pain mgmt doctor and it ends on Sept 1st. Pt was advised by her Pain Mgmt Doc to check with Dr Stephanie Torres if he is OK with Pt returning to work and if not if he is willing to extend Pt's FMLA.

## 2020-08-24 NOTE — TELEPHONE ENCOUNTER
Called patient and spoke with her. Patient states that she sees Dr. Charlie Dunn once monthly. Patient states that in March Dr. Charlie Dunn took patient off work d/t Covid.   Patient return to work date with him is September 1st.  Dr. Charlie Dunn would like patient's PCP to ta

## 2020-08-27 ENCOUNTER — TELEPHONE (OUTPATIENT)
Dept: FAMILY MEDICINE CLINIC | Facility: CLINIC | Age: 55
End: 2020-08-27

## 2020-08-27 NOTE — TELEPHONE ENCOUNTER
Patient dropped off paperwork for completion. Pt would like to pick it up either tomorrow or Saturday as it is due Monday. Copy made and filed in the black accordion behind the , original forwarded to Triage.

## 2020-08-31 NOTE — TELEPHONE ENCOUNTER
See 8/24/20 TE. Per Dr. Goran Michaels anticipated return to work date of 12/1/20. Form completed for patient as requested. Form also faxed as requested. Fax confirmation received. Called patient and spoke with her. Advised her of this information.   P

## 2020-08-31 NOTE — TELEPHONE ENCOUNTER
Fax received from [facility]. Form completed and signed by provider. Form faxed back as requested along with requested office visit note. Fax confirmation received. Called patient and spoke with her. Advised her of this information.   Patient states un

## 2020-09-10 ENCOUNTER — TELEPHONE (OUTPATIENT)
Dept: FAMILY MEDICINE CLINIC | Facility: CLINIC | Age: 55
End: 2020-09-10

## 2020-09-10 NOTE — TELEPHONE ENCOUNTER
Fax received from the [facility] requesting last office visit note. They are requesting office visit note from 9/1/20 onward. Patient has not been seen since August.  This office visit faxed along with a note that this is the most recent visit.   Fax confir

## 2020-09-18 DIAGNOSIS — G43.001 MIGRAINE WITHOUT AURA AND WITH STATUS MIGRAINOSUS, NOT INTRACTABLE: ICD-10-CM

## 2020-09-18 RX ORDER — MECLIZINE HCL 12.5 MG/1
TABLET ORAL
Qty: 30 TABLET | Refills: 0 | Status: SHIPPED | OUTPATIENT
Start: 2020-09-18 | End: 2020-10-14

## 2020-09-18 NOTE — TELEPHONE ENCOUNTER
Medication(s) to Refill:   Requested Prescriptions     Pending Prescriptions Disp Refills   • MECLIZINE HCL 12.5 MG Oral Tab [Pharmacy Med Name: Meclizine Hcl 12.5 Mg Tab Rugb] 30 tablet 0     Sig: TAKE TWO TABLETS BY MOUTH TWICE DAILY AS NEEDED FOR [facility]

## 2020-10-14 DIAGNOSIS — F41.1 GAD (GENERALIZED ANXIETY DISORDER): ICD-10-CM

## 2020-10-14 DIAGNOSIS — G43.001 MIGRAINE WITHOUT AURA AND WITH STATUS MIGRAINOSUS, NOT INTRACTABLE: ICD-10-CM

## 2020-10-14 NOTE — TELEPHONE ENCOUNTER
Pt is in [place] and needs refills of MECLIZINE HCL 12.5 MG Oral Tab   And Alprazolam sent to [facility] in [facility]: [phone]/: [phone].

## 2020-10-16 RX ORDER — ALPRAZOLAM 0.5 MG/1
0.5 TABLET ORAL 2 TIMES DAILY PRN
Qty: 60 TABLET | Refills: 0 | Status: SHIPPED | OUTPATIENT
Start: 2020-10-16 | End: 2020-11-03

## 2020-10-16 RX ORDER — MECLIZINE HCL 12.5 MG/1
25 TABLET ORAL 2 TIMES DAILY PRN
Qty: 30 TABLET | Refills: 0 | Status: SHIPPED | OUTPATIENT
Start: 2020-10-16 | End: 2020-11-03

## 2020-11-03 ENCOUNTER — OFFICE VISIT (OUTPATIENT)
Dept: FAMILY MEDICINE CLINIC | Facility: CLINIC | Age: 55
End: 2020-11-03
Payer: COMMERCIAL

## 2020-11-03 VITALS
DIASTOLIC BLOOD PRESSURE: 70 MMHG | SYSTOLIC BLOOD PRESSURE: 128 MMHG | BODY MASS INDEX: 21.83 KG/M2 | RESPIRATION RATE: 16 BRPM | WEIGHT: 104 LBS | HEIGHT: 58 IN | OXYGEN SATURATION: 96 % | HEART RATE: 86 BPM

## 2020-11-03 DIAGNOSIS — E78.5 HYPERLIPIDEMIA, UNSPECIFIED HYPERLIPIDEMIA TYPE: ICD-10-CM

## 2020-11-03 DIAGNOSIS — R06.00 DYSPNEA ON EXERTION: ICD-10-CM

## 2020-11-03 DIAGNOSIS — F41.1 GAD (GENERALIZED ANXIETY DISORDER): ICD-10-CM

## 2020-11-03 DIAGNOSIS — Z98.890 S/P BALLOON MITRAL VALVULOPLASTY: ICD-10-CM

## 2020-11-03 DIAGNOSIS — E03.9 HYPOTHYROIDISM, UNSPECIFIED TYPE: ICD-10-CM

## 2020-11-03 DIAGNOSIS — G43.001 MIGRAINE WITHOUT AURA AND WITH STATUS MIGRAINOSUS, NOT INTRACTABLE: Primary | ICD-10-CM

## 2020-11-03 PROCEDURE — 3074F SYST BP LT 130 MM HG: CPT | Performed by: FAMILY MEDICINE

## 2020-11-03 PROCEDURE — 3078F DIAST BP <80 MM HG: CPT | Performed by: FAMILY MEDICINE

## 2020-11-03 PROCEDURE — 99215 OFFICE O/P EST HI 40 MIN: CPT | Performed by: FAMILY MEDICINE

## 2020-11-03 PROCEDURE — 3008F BODY MASS INDEX DOCD: CPT | Performed by: FAMILY MEDICINE

## 2020-11-03 RX ORDER — SUMATRIPTAN 100 MG/1
TABLET, FILM COATED ORAL
Qty: 9 TABLET | Refills: 1 | Status: SHIPPED | OUTPATIENT
Start: 2020-11-03 | End: 2021-03-23

## 2020-11-03 RX ORDER — MECLIZINE HCL 12.5 MG/1
25 TABLET ORAL 2 TIMES DAILY PRN
Qty: 60 TABLET | Refills: 0 | Status: SHIPPED | OUTPATIENT
Start: 2020-11-03 | End: 2020-11-30

## 2020-11-03 RX ORDER — UBROGEPANT 50 MG/1
TABLET ORAL
Qty: 3 TABLET | Refills: 0 | COMMUNITY
Start: 2020-11-03 | End: 2021-02-15 | Stop reason: ALTCHOICE

## 2020-11-03 RX ORDER — ALPRAZOLAM 0.5 MG/1
0.5 TABLET ORAL 2 TIMES DAILY PRN
Qty: 60 TABLET | Refills: 0 | Status: SHIPPED | OUTPATIENT
Start: 2020-11-03 | End: 2020-12-21

## 2020-11-06 NOTE — PROGRESS NOTES
Chief Complaint:   Patient presents with:   Follow - Up    HPI:   This is a 54year old female presenting for follow up: Patient sees cardiology for a history of aortic valve disorder,   With a history of mixed hyperlipidemia with aortic valve disorder with FIBROSIS. • VALVE REPAIR  08/2018    mitral valve   • VALVE REPLACEMENT  07/2012    [facility]/Dr Lorri García: (1) AORTIC VALVE REPLACEMENT WITH 19-MM MARSHALL PERICARDIAL MAGNA EASE VALVE. (2) MITRAL VALVE DEBRIDEMENT OF FIBROSIS.      Social History:  Soc ASPIRIN) 81 MG Oral Tab EC Take 81 mg by mouth daily. • Cholecalciferol (D-5000) 5000 units Oral Tab Take 1 tablet by mouth daily. • Orphenadrine Citrate  MG Oral Tablet 12 Hr Take 100 mg by mouth 2 (two) times daily.   2   • Butalbital-APAP-C Pulse 86   Resp 16   Ht 58\"   Wt 104 lb (47.2 kg)   SpO2 96%   BMI 21.74 kg/m²  Estimated body mass index is 21.74 kg/m² as calculated from the following:    Height as of this encounter: 58\". Weight as of this encounter: 104 lb (47.2 kg).    Vital sig Coordination and gait normal.   Skin: Skin is warm and dry. No lesion and no rash noted. No erythema. No pallor. Does not exhibit alopecia  Psychiatric: She has a normal mood and affect.  Her behavior is normal. Judgment and thought content normal.        A

## 2020-11-11 ENCOUNTER — TELEPHONE (OUTPATIENT)
Dept: FAMILY MEDICINE CLINIC | Facility: CLINIC | Age: 55
End: 2020-11-11

## 2020-11-11 NOTE — TELEPHONE ENCOUNTER
----- Message from Sue Romeo, JAZMINE SHAFFER sent at 11/10/2020  8:58 AM CST -----  Patient had visit with PCP on 11/03/20. Can we confirm if labs were reviewed? Thank you!

## 2020-11-11 NOTE — TELEPHONE ENCOUNTER
Patient had labs drawn on 10/31/20 under Deannie Libman. Patient then had an appointment with you on 11/3/20. Did you go over results with patient? Please advise. Thank you!

## 2020-11-13 ENCOUNTER — TELEPHONE (OUTPATIENT)
Dept: FAMILY MEDICINE CLINIC | Facility: CLINIC | Age: 55
End: 2020-11-13

## 2020-11-13 DIAGNOSIS — R06.00 DYSPNEA ON EXERTION: Primary | ICD-10-CM

## 2020-11-13 DIAGNOSIS — Z98.890 S/P BALLOON MITRAL VALVULOPLASTY: ICD-10-CM

## 2020-11-13 NOTE — TELEPHONE ENCOUNTER
Doc,    The CT chest was denied by pt's insurance per below    Based on [employer] Guidelines Section(s): CH 5.1 Dyspnea/Shortness of Breath, we   cannot approve this request. Your records show that you have shortness of breath.  The reason this

## 2020-11-13 NOTE — TELEPHONE ENCOUNTER
She doesn't have chronic shortness of breath. Please change that patient doesn't have shortness of breath.

## 2020-11-14 NOTE — TELEPHONE ENCOUNTER
Would you order a CXR first?  Looks like that is one of the requirements they do not see.     thanks

## 2020-11-23 ENCOUNTER — HOSPITAL ENCOUNTER (OUTPATIENT)
Dept: GENERAL RADIOLOGY | Age: 55
Discharge: HOME | End: 2020-11-23
Attending: FAMILY MEDICINE
Payer: COMMERCIAL

## 2020-11-23 ENCOUNTER — HOSPITAL ENCOUNTER (OUTPATIENT)
Dept: CT IMAGING | Age: 55
End: 2020-11-23
Attending: FAMILY MEDICINE
Payer: COMMERCIAL

## 2020-11-23 ENCOUNTER — TELEPHONE (OUTPATIENT)
Dept: FAMILY MEDICINE CLINIC | Facility: CLINIC | Age: 55
End: 2020-11-23

## 2020-11-23 DIAGNOSIS — R06.00 DYSPNEA ON EXERTION: ICD-10-CM

## 2020-11-23 DIAGNOSIS — Z98.890 S/P BALLOON MITRAL VALVULOPLASTY: ICD-10-CM

## 2020-11-23 PROCEDURE — 71046 X-RAY EXAM CHEST 2 VIEWS: CPT | Performed by: FAMILY MEDICINE

## 2020-11-23 NOTE — TELEPHONE ENCOUNTER
Pt came in and brought some paperwork with her for her STD because she said her work is giving her a hard time for the disability reason. She needs new paperwork filled out, and a new letter drafted.     Discussed with Dr. Kaylah Holcomb and he is ok with the let

## 2020-11-24 ENCOUNTER — TELEPHONE (OUTPATIENT)
Dept: FAMILY MEDICINE CLINIC | Facility: CLINIC | Age: 55
End: 2020-11-24

## 2020-11-24 DIAGNOSIS — R91.8 ABNORMALITY OF LUNG ON CXR: Primary | ICD-10-CM

## 2020-11-24 DIAGNOSIS — J84.9 INTERSTITIAL LUNG DISEASE (HCC): ICD-10-CM

## 2020-11-24 NOTE — TELEPHONE ENCOUNTER
----- Message from Ofe Landry MD sent at 11/24/2020  2:11 PM CST -----  ILD noted on chest xray(appears chronic), needs to get CT chest and referral to pulm, dr. Aris Chapman or dr. Anthony Marrero. Ct Chest ordered. Pending approval with insurance.     Will contac

## 2020-11-27 DIAGNOSIS — G43.001 MIGRAINE WITHOUT AURA AND WITH STATUS MIGRAINOSUS, NOT INTRACTABLE: ICD-10-CM

## 2020-11-30 RX ORDER — MECLIZINE HCL 12.5 MG/1
TABLET ORAL
Qty: 60 TABLET | Refills: 0 | Status: SHIPPED | OUTPATIENT
Start: 2020-11-30 | End: 2020-12-13

## 2020-12-07 ENCOUNTER — HOSPITAL ENCOUNTER (OUTPATIENT)
Dept: CT IMAGING | Age: 55
Discharge: HOME | End: 2020-12-07
Attending: FAMILY MEDICINE
Payer: COMMERCIAL

## 2020-12-07 DIAGNOSIS — J84.9 INTERSTITIAL LUNG DISEASE (HCC): ICD-10-CM

## 2020-12-07 DIAGNOSIS — R91.8 ABNORMALITY OF LUNG ON CXR: ICD-10-CM

## 2020-12-07 PROCEDURE — 71250 CT THORAX DX C-: CPT | Performed by: FAMILY MEDICINE

## 2020-12-11 NOTE — PROGRESS NOTES
Informed pt of CT results. Verbalized understanding. Appts made for PFT and new consults. Denied further questions after PFT education was given.

## 2020-12-12 DIAGNOSIS — G43.001 MIGRAINE WITHOUT AURA AND WITH STATUS MIGRAINOSUS, NOT INTRACTABLE: ICD-10-CM

## 2020-12-12 NOTE — TELEPHONE ENCOUNTER
Medication(s) to Refill:   Requested Prescriptions     Pending Prescriptions Disp Refills   • MECLIZINE HCL 12.5 MG Oral Tab [Pharmacy Med Name: Meclizine Hydrochloride 12.5 Mg Tab Rugb] 60 tablet 0     Sig: TAKE TWO TABLETS BY MOUTH TWICE DAILY AS NEEDED

## 2020-12-13 RX ORDER — MECLIZINE HCL 12.5 MG/1
TABLET ORAL
Qty: 60 TABLET | Refills: 0 | Status: SHIPPED | OUTPATIENT
Start: 2020-12-13 | End: 2020-12-29

## 2020-12-18 DIAGNOSIS — F41.1 GAD (GENERALIZED ANXIETY DISORDER): ICD-10-CM

## 2020-12-21 RX ORDER — ALPRAZOLAM 0.5 MG/1
TABLET ORAL
Qty: 60 TABLET | Refills: 0 | Status: SHIPPED | OUTPATIENT
Start: 2020-12-21 | End: 2021-01-18

## 2020-12-29 DIAGNOSIS — G43.001 MIGRAINE WITHOUT AURA AND WITH STATUS MIGRAINOSUS, NOT INTRACTABLE: ICD-10-CM

## 2020-12-29 RX ORDER — MECLIZINE HCL 12.5 MG/1
TABLET ORAL
Qty: 60 TABLET | Refills: 0 | Status: SHIPPED | OUTPATIENT
Start: 2020-12-29 | End: 2021-03-23

## 2020-12-29 NOTE — TELEPHONE ENCOUNTER
60 tabs were just sent to pharmacy on 12/13/20. Has she picked this up yet? If she already needs refill she should schedule f/u for dizziness.

## 2021-01-07 ENCOUNTER — TELEPHONE (OUTPATIENT)
Dept: FAMILY MEDICINE CLINIC | Facility: CLINIC | Age: 56
End: 2021-01-07

## 2021-01-07 NOTE — TELEPHONE ENCOUNTER
Called to obtain more information regarding peer to peer. Office number provided. Awaiting call back.

## 2021-01-07 NOTE — TELEPHONE ENCOUNTER
Dr Shonna Salmeron called to do a peer to peer review for this patient. Demetra Llanes stated to route this to triage.

## 2021-01-15 DIAGNOSIS — F41.1 GAD (GENERALIZED ANXIETY DISORDER): ICD-10-CM

## 2021-01-18 RX ORDER — ALPRAZOLAM 0.5 MG/1
TABLET ORAL
Qty: 60 TABLET | Refills: 0 | Status: SHIPPED | OUTPATIENT
Start: 2021-01-18 | End: 2021-03-23

## 2021-02-14 LAB
ALBUMIN/GLOBULIN RATIO: 1.3 (CALC) (ref 1–2.5)
ALBUMIN: 3.7 G/DL (ref 3.6–5.1)
ALKALINE PHOSPHATASE: 145 U/L (ref 37–153)
ALT: 8 U/L (ref 6–29)
AST: 15 U/L (ref 10–35)
BILIRUBIN, TOTAL: 0.3 MG/DL (ref 0.2–1.2)
BUN/CREATININE RATIO: 12 (CALC) (ref 6–22)
BUN: 13 MG/DL (ref 7–25)
CALCIUM: 8.9 MG/DL (ref 8.6–10.4)
CARBON DIOXIDE: 25 MMOL/L (ref 20–32)
CHLORIDE: 106 MMOL/L (ref 98–110)
CHOL/HDLC RATIO: 5.3 (CALC)
CHOLESTEROL, TOTAL: 149 MG/DL
CREATININE: 1.08 MG/DL (ref 0.5–1.05)
EGFR IF AFRICN AM: 67 ML/MIN/1.73M2
EGFR IF NONAFRICN AM: 58 ML/MIN/1.73M2
GLOBULIN: 2.9 G/DL (CALC) (ref 1.9–3.7)
GLUCOSE: 95 MG/DL (ref 65–99)
HDL CHOLESTEROL: 28 MG/DL
LDL-CHOLESTEROL: 98 MG/DL (CALC)
NON-HDL CHOLESTEROL: 121 MG/DL (CALC)
POTASSIUM: 4.6 MMOL/L (ref 3.5–5.3)
PROTEIN, TOTAL: 6.6 G/DL (ref 6.1–8.1)
SODIUM: 136 MMOL/L (ref 135–146)
TRIGLYCERIDES: 131 MG/DL
TSH W/REFLEX TO FT4: 1.57 MIU/L

## 2021-02-15 ENCOUNTER — HOSPITAL ENCOUNTER (OUTPATIENT)
Dept: GENERAL RADIOLOGY | Age: 56
Discharge: HOME | End: 2021-02-15
Attending: FAMILY MEDICINE
Payer: COMMERCIAL

## 2021-02-15 ENCOUNTER — OFFICE VISIT (OUTPATIENT)
Dept: FAMILY MEDICINE CLINIC | Facility: CLINIC | Age: 56
End: 2021-02-15
Payer: COMMERCIAL

## 2021-02-15 ENCOUNTER — TELEPHONE (OUTPATIENT)
Dept: FAMILY MEDICINE CLINIC | Facility: CLINIC | Age: 56
End: 2021-02-15

## 2021-02-15 VITALS
RESPIRATION RATE: 20 BRPM | DIASTOLIC BLOOD PRESSURE: 60 MMHG | OXYGEN SATURATION: 94 % | HEIGHT: 58 IN | HEART RATE: 74 BPM | TEMPERATURE: 97 F | WEIGHT: 111 LBS | BODY MASS INDEX: 23.3 KG/M2 | SYSTOLIC BLOOD PRESSURE: 96 MMHG

## 2021-02-15 DIAGNOSIS — R06.02 SHORTNESS OF BREATH: Primary | ICD-10-CM

## 2021-02-15 DIAGNOSIS — R06.02 SHORTNESS OF BREATH: ICD-10-CM

## 2021-02-15 DIAGNOSIS — J84.9 INTERSTITIAL LUNG DISEASE (HCC): ICD-10-CM

## 2021-02-15 PROCEDURE — 3008F BODY MASS INDEX DOCD: CPT | Performed by: FAMILY MEDICINE

## 2021-02-15 PROCEDURE — 3078F DIAST BP <80 MM HG: CPT | Performed by: FAMILY MEDICINE

## 2021-02-15 PROCEDURE — 3074F SYST BP LT 130 MM HG: CPT | Performed by: FAMILY MEDICINE

## 2021-02-15 PROCEDURE — 99214 OFFICE O/P EST MOD 30 MIN: CPT | Performed by: FAMILY MEDICINE

## 2021-02-15 PROCEDURE — 71046 X-RAY EXAM CHEST 2 VIEWS: CPT | Performed by: FAMILY MEDICINE

## 2021-02-15 RX ORDER — CHLORZOXAZONE 500 MG/1
TABLET ORAL
COMMUNITY
Start: 2021-01-25

## 2021-02-15 RX ORDER — PREDNISONE 20 MG/1
40 TABLET ORAL DAILY
Qty: 10 TABLET | Refills: 0 | Status: SHIPPED | OUTPATIENT
Start: 2021-02-15 | End: 2021-02-19 | Stop reason: ALTCHOICE

## 2021-02-15 RX ORDER — AZITHROMYCIN 250 MG/1
TABLET, FILM COATED ORAL
Qty: 6 TABLET | Refills: 0 | Status: SHIPPED | OUTPATIENT
Start: 2021-02-15 | End: 2021-02-20

## 2021-02-15 RX ORDER — IPRATROPIUM BROMIDE AND ALBUTEROL SULFATE 2.5; .5 MG/3ML; MG/3ML
3 SOLUTION RESPIRATORY (INHALATION) EVERY 6 HOURS PRN
Qty: 360 ML | Refills: 0 | Status: SHIPPED | OUTPATIENT
Start: 2021-02-15 | End: 2021-03-17

## 2021-02-15 NOTE — TELEPHONE ENCOUNTER
----- Message from Lara Shook MD sent at 2/15/2021  4:14 PM CST -----  Results reviewed. CXR stable in comparison to CT in 12/20. Continue inhaler therapy, steroids, zpak and follow up on Friday or sooner PRN.

## 2021-02-15 NOTE — PROGRESS NOTES
[facility] Family Medicine Office Note  Chief Complaint:   Patient presents with:  Shortness Of Breath: Pt here c/o SOB notes has HX of SOB but states since ths past Thrusday has gotten worse even after using inhaler.        HPI:   This is a 54 ye OF FIBROSIS. Social History:  Social History    Tobacco Use      Smoking status: Current Every Day Smoker        Packs/day: 0.50        Years: 39.00        Pack years: 19.5        Types: Cigarettes      Smokeless tobacco: Never Used    Alcohol use:  No [facility]) -40 MG Oral Cap Take 1 capsule by mouth every 4 (four) hours as needed for Pain or Headaches. 15 capsule 3   • chlorzoxazone 500 MG Oral Tab      • Orphenadrine Citrate  MG Oral Tablet 12 Hr Take 100 mg by mouth 2 (two) times daily. rash noted. She is not diaphoretic. Brisk cap refill   Psychiatric: She has a normal mood and affect. ASSESSMENT AND PLAN:   1. Shortness of breath  ILD vs COPD exacerbation? Lower suspicion that this is cardiac mediated.  Will obtain CXR and start Disorder of bursae and tendons in shoulder region     KIKI (generalized anxiety disorder)     Chronically on benzodiazepine therapy     Heart murmur     Aortic valve disorder     S/P balloon mitral valvuloplasty     Rheumatic mitral stenosis     Hereditary

## 2021-02-15 NOTE — PATIENT INSTRUCTIONS
1. Start prednisone daily for 5 days. Take in the morning to minimize any insomnia. 2. Start ZPAK     3. Complete xray    4. Follow up on Friday or sooner as needed. If worsening shortness of breath please call back or present to emergency room.

## 2021-02-19 ENCOUNTER — TELEPHONE (OUTPATIENT)
Dept: FAMILY MEDICINE CLINIC | Facility: CLINIC | Age: 56
End: 2021-02-19

## 2021-02-19 ENCOUNTER — OFFICE VISIT (OUTPATIENT)
Dept: FAMILY MEDICINE CLINIC | Facility: CLINIC | Age: 56
End: 2021-02-19
Payer: COMMERCIAL

## 2021-02-19 VITALS
DIASTOLIC BLOOD PRESSURE: 62 MMHG | HEART RATE: 78 BPM | RESPIRATION RATE: 16 BRPM | HEIGHT: 58 IN | WEIGHT: 111 LBS | OXYGEN SATURATION: 96 % | TEMPERATURE: 97 F | BODY MASS INDEX: 23.3 KG/M2 | SYSTOLIC BLOOD PRESSURE: 98 MMHG

## 2021-02-19 DIAGNOSIS — R53.81 PHYSICAL DECONDITIONING: ICD-10-CM

## 2021-02-19 DIAGNOSIS — E03.9 HYPOTHYROIDISM, UNSPECIFIED TYPE: ICD-10-CM

## 2021-02-19 DIAGNOSIS — F41.1 GAD (GENERALIZED ANXIETY DISORDER): ICD-10-CM

## 2021-02-19 DIAGNOSIS — E78.5 HYPERLIPIDEMIA, UNSPECIFIED HYPERLIPIDEMIA TYPE: Primary | ICD-10-CM

## 2021-02-19 DIAGNOSIS — J84.09: ICD-10-CM

## 2021-02-19 DIAGNOSIS — R06.00 EXERTIONAL DYSPNEA: ICD-10-CM

## 2021-02-19 DIAGNOSIS — J84.9 ILD (INTERSTITIAL LUNG DISEASE) (HCC): Primary | ICD-10-CM

## 2021-02-19 DIAGNOSIS — I35.9 AORTIC VALVE DISORDER: ICD-10-CM

## 2021-02-19 PROCEDURE — 3008F BODY MASS INDEX DOCD: CPT | Performed by: FAMILY MEDICINE

## 2021-02-19 PROCEDURE — 99215 OFFICE O/P EST HI 40 MIN: CPT | Performed by: FAMILY MEDICINE

## 2021-02-19 PROCEDURE — 3078F DIAST BP <80 MM HG: CPT | Performed by: FAMILY MEDICINE

## 2021-02-19 PROCEDURE — 3074F SYST BP LT 130 MM HG: CPT | Performed by: FAMILY MEDICINE

## 2021-02-19 RX ORDER — METHYLPREDNISOLONE 4 MG/1
TABLET ORAL
Qty: 1 KIT | Refills: 0 | Status: SHIPPED | OUTPATIENT
Start: 2021-02-19 | End: 2021-03-04 | Stop reason: ALTCHOICE

## 2021-02-19 RX ORDER — AMOXICILLIN AND CLAVULANATE POTASSIUM 875; 125 MG/1; MG/1
1 TABLET, FILM COATED ORAL 2 TIMES DAILY
Qty: 20 TABLET | Refills: 0 | Status: SHIPPED | OUTPATIENT
Start: 2021-02-19 | End: 2021-03-01

## 2021-02-19 RX ORDER — VARENICLINE TARTRATE 0.5 (11)-1
KIT ORAL
Qty: 1 PACKAGE | Refills: 0 | Status: SHIPPED | OUTPATIENT
Start: 2021-02-19

## 2021-02-19 RX ORDER — ALBUTEROL SULFATE 90 UG/1
2 AEROSOL, METERED RESPIRATORY (INHALATION) EVERY 6 HOURS PRN
Qty: 1 INHALER | Refills: 3 | Status: SHIPPED | OUTPATIENT
Start: 2021-02-19 | End: 2021-06-04

## 2021-02-19 NOTE — TELEPHONE ENCOUNTER
----- Message from Gonzalo Mercado MD sent at 2/18/2021  8:42 PM CST -----  Stable, CPM recheck in 3 months.

## 2021-02-22 ENCOUNTER — TELEPHONE (OUTPATIENT)
Dept: FAMILY MEDICINE CLINIC | Facility: CLINIC | Age: 56
End: 2021-02-22

## 2021-02-22 NOTE — PROGRESS NOTES
Chief Complaint:   Patient presents with:   Follow - Up: SOB, fatigue     HPI:   This is a 54year old female presenting for follow up: Patient sees cardiology for a history of aortic valve disorder,   With a history of mixed hyperlipidemia with aortic valv hereditary    • Multiple exostosis, hereditary    • Raynaud phenomenon    • Thyroid disease      Past Surgical History:   Procedure Laterality Date   •      • ECHOCARDIOGRAM     • LASIK     • VALVE REPAIR  2012    Yenifer/Dr Kimmy Stein: (1) (shortness of breath).  360 mL 0   • ALPRAZOLAM 0.5 MG Oral Tab TAKE ONE TABLET BY MOUTH TWICE DAILY AS NEEDED FOR SLEEP 60 tablet 0   • MECLIZINE HCL 12.5 MG Oral Tab TAKE TWO TABLETS BY MOUTH TWICE DAILY AS NEEDED FOR DIZZINESS 60 tablet 0   • SUMAtriptan Cardiovascular: Negative for chest pain, palpitations and leg swelling. Gastrointestinal: Negative for vomiting, abdominal pain, diarrhea, blood in stool and abdominal distention.    Endocrine: Negative for cold intolerance, heat intolerance, polydipsia scleral icterus. Neck: Normal range of motion. Neck supple. No JVD present. No tracheal deviation present. No thyromegaly present. Cardiovascular: Normal rate, regular rhythm, normal heart sounds and intact distal pulses.   Exam reveals no gallop and no PULMONARY REHAB    2. Aortic valve disorder  -stable, sees Cardiology  - Amoxicillin-Pot Clavulanate 875-125 MG Oral Tab; Take 1 tablet by mouth 2 (two) times daily for 10 days. Dispense: 20 tablet;  Refill: 0  - methylPREDNISolone (MEDROL) 4 MG Oral Table Oral Misc; Take as directed  Dispense: 1 Package; Refill: 0  - OP REFERRAL TO [facility] PULMONARY REHAB    5. Physical deconditioning  -needs pulm rehab  - Amoxicillin-Pot Clavulanate 875-125 MG Oral Tab; Take 1 tablet by mouth 2 (two) times daily for 10 days.

## 2021-02-22 NOTE — TELEPHONE ENCOUNTER
Patient was seen on Friday she is high risk for getting admitted to the hospital.  Patient has a history of dyspnea on exertion with a history of interstitial lung disease right feel like she might have a bronchitis flare.     Patient is on oral steroids an

## 2021-02-23 NOTE — TELEPHONE ENCOUNTER
Called pt to get condition update but mailbox is full and will not accept any messages at this time. Will attempt to call pt at another time.

## 2021-02-24 NOTE — TELEPHONE ENCOUNTER
ARIANA we did call patient a couple of time but she has not called back. I did send her a my chart message today that she read but no response.

## 2021-03-03 ENCOUNTER — TELEPHONE (OUTPATIENT)
Dept: FAMILY MEDICINE CLINIC | Facility: CLINIC | Age: 56
End: 2021-03-03

## 2021-03-03 NOTE — TELEPHONE ENCOUNTER
Pt dropped off [employer] forms, requesting forms to be updated. Pt was seen 2/19, stated she forgot to bring forms when she was in. Needs forms completed by 3/5, will . Also states she is not feeling any better and still having a hard time breathing.

## 2021-03-03 NOTE — TELEPHONE ENCOUNTER
Patient states that she dropped off disability paperwork to be completed. Patient has finished the steroid pack and almost completed the antibiotic. Patient still experiencing SOB when walking short distance.  Denies SOB, difficulty breathing, or chest p

## 2021-03-04 ENCOUNTER — OFFICE VISIT (OUTPATIENT)
Dept: FAMILY MEDICINE CLINIC | Facility: CLINIC | Age: 56
End: 2021-03-04
Payer: COMMERCIAL

## 2021-03-04 VITALS
DIASTOLIC BLOOD PRESSURE: 64 MMHG | TEMPERATURE: 97 F | BODY MASS INDEX: 23.51 KG/M2 | HEIGHT: 58 IN | WEIGHT: 112 LBS | HEART RATE: 68 BPM | RESPIRATION RATE: 16 BRPM | SYSTOLIC BLOOD PRESSURE: 112 MMHG | OXYGEN SATURATION: 95 %

## 2021-03-04 DIAGNOSIS — R06.00 EXERTIONAL DYSPNEA: ICD-10-CM

## 2021-03-04 DIAGNOSIS — I05.0 RHEUMATIC MITRAL STENOSIS: ICD-10-CM

## 2021-03-04 DIAGNOSIS — F17.200 TOBACCO DEPENDENCE: ICD-10-CM

## 2021-03-04 DIAGNOSIS — R53.81 PHYSICAL DECONDITIONING: ICD-10-CM

## 2021-03-04 DIAGNOSIS — I35.9 AORTIC VALVE DISORDER: ICD-10-CM

## 2021-03-04 DIAGNOSIS — J84.9 ILD (INTERSTITIAL LUNG DISEASE) (HCC): Primary | ICD-10-CM

## 2021-03-04 PROCEDURE — 3074F SYST BP LT 130 MM HG: CPT | Performed by: NURSE PRACTITIONER

## 2021-03-04 PROCEDURE — 3008F BODY MASS INDEX DOCD: CPT | Performed by: NURSE PRACTITIONER

## 2021-03-04 PROCEDURE — 99214 OFFICE O/P EST MOD 30 MIN: CPT | Performed by: NURSE PRACTITIONER

## 2021-03-04 PROCEDURE — 3078F DIAST BP <80 MM HG: CPT | Performed by: NURSE PRACTITIONER

## 2021-03-04 RX ORDER — PREDNISONE 20 MG/1
TABLET ORAL
Qty: 20 TABLET | Refills: 0 | Status: SHIPPED | OUTPATIENT
Start: 2021-03-04 | End: 2021-03-17

## 2021-03-04 RX ORDER — BUDESONIDE, GLYCOPYRROLATE, AND FORMOTEROL FUMARATE 160; 9; 4.8 UG/1; UG/1; UG/1
160 AEROSOL, METERED RESPIRATORY (INHALATION) 2 TIMES DAILY
Qty: 0.16 G | Refills: 0 | COMMUNITY
Start: 2021-03-04

## 2021-03-04 RX ORDER — BUDESONIDE, GLYCOPYRROLATE, AND FORMOTEROL FUMARATE 160; 9; 4.8 UG/1; UG/1; UG/1
2 AEROSOL, METERED RESPIRATORY (INHALATION) 2 TIMES DAILY
Qty: 2 G | Refills: 0 | COMMUNITY
Start: 2021-03-04 | End: 2021-03-04 | Stop reason: ALTCHOICE

## 2021-03-04 NOTE — PROGRESS NOTES
Chief Complaint:   Patient presents with:  Paperwork: disability forms     HPI:   This is a 54year old female presenting for f/u of interstitial lung disease and exertional dyspnea. Taking Breo 1 puff daily.  Has albuterol inhaler that she uses 2 puffs loly stenosis    • Multiple exostosis, hereditary    • Multiple exostosis, hereditary    • Raynaud phenomenon    • Thyroid disease      Past Surgical History:   Procedure Laterality Date   •      • ECHOCARDIOGRAM     • LASIK     • VALVE REPAIR  2012 DAILY AS NEEDED FOR SLEEP 60 tablet 0   • MECLIZINE HCL 12.5 MG Oral Tab TAKE TWO TABLETS BY MOUTH TWICE DAILY AS NEEDED FOR DIZZINESS 60 tablet 0   • SUMAtriptan Succinate (IMITREX) 100 MG Oral Tab Take one tablet at onset of migraine.  May repeat after 1 for dizziness, light-headedness and headaches.      EXAM:   /64   Pulse 68   Temp 96.9 °F (36.1 °C) (Temporal)   Resp 16   Ht 4' 10\" (1.473 m)   Wt 112 lb (50.8 kg)   LMP  (Approximate)   SpO2 95%   BMI 23.41 kg/m²  Estimated body mass index is 23.41 detail. She is stable upon discharge from our office.   - predniSONE 20 MG Oral Tab; Take 3 tablets (60 mg) daily x 3 days, then 2 tablets (40 mg) daily x 3 days, then 1 tablet (20 mg daily) x 3 days, then 1/2 tablet daily x 3 days, then stop.    - Budeson-

## 2021-03-04 NOTE — PATIENT INSTRUCTIONS
Sofiya Pacheco,     It was nice to see you today. Here is a recap from our visit:    1. Stop the Breo and start the Breztri samples you were given today. Take 2 puffs, twice daily. 2. Start the steroid taper that was sent to your pharmacy.      3. Continue eff

## 2021-03-05 DIAGNOSIS — Z23 NEED FOR VACCINATION: ICD-10-CM

## 2021-03-05 PROBLEM — J90 PLEURAL EFFUSION: Status: ACTIVE | Noted: 2021-03-05

## 2021-03-05 PROBLEM — R06.02 SOB (SHORTNESS OF BREATH): Status: ACTIVE | Noted: 2021-03-05

## 2021-03-05 PROBLEM — I34.0 MODERATE MITRAL VALVE REGURGITATION: Status: ACTIVE | Noted: 2021-03-05

## 2021-03-08 ENCOUNTER — HOSPITAL ENCOUNTER (OUTPATIENT)
Dept: CT IMAGING | Age: 56
Discharge: HOME | End: 2021-03-08
Attending: INTERNAL MEDICINE
Payer: COMMERCIAL

## 2021-03-08 DIAGNOSIS — R06.00 EXERTIONAL DYSPNEA: ICD-10-CM

## 2021-03-08 DIAGNOSIS — J84.9 ILD (INTERSTITIAL LUNG DISEASE) (HCC): ICD-10-CM

## 2021-03-08 PROCEDURE — 71250 CT THORAX DX C-: CPT | Performed by: INTERNAL MEDICINE

## 2021-03-11 ENCOUNTER — MED REC SCAN ONLY (OUTPATIENT)
Dept: FAMILY MEDICINE CLINIC | Facility: CLINIC | Age: 56
End: 2021-03-11

## 2021-03-22 DIAGNOSIS — G43.001 MIGRAINE WITHOUT AURA AND WITH STATUS MIGRAINOSUS, NOT INTRACTABLE: ICD-10-CM

## 2021-03-22 DIAGNOSIS — F41.1 GAD (GENERALIZED ANXIETY DISORDER): ICD-10-CM

## 2021-03-23 RX ORDER — ALPRAZOLAM 0.5 MG/1
TABLET ORAL
Qty: 60 TABLET | Refills: 0 | Status: SHIPPED | OUTPATIENT
Start: 2021-03-23 | End: 2021-04-20

## 2021-03-23 RX ORDER — MECLIZINE HCL 12.5 MG/1
TABLET ORAL
Qty: 60 TABLET | Refills: 0 | Status: SHIPPED | OUTPATIENT
Start: 2021-03-23 | End: 2021-04-20

## 2021-03-23 RX ORDER — SUMATRIPTAN 100 MG/1
TABLET, FILM COATED ORAL
Qty: 9 TABLET | Refills: 0 | Status: SHIPPED | OUTPATIENT
Start: 2021-03-23 | End: 2021-04-20

## 2021-04-20 DIAGNOSIS — F41.1 GAD (GENERALIZED ANXIETY DISORDER): ICD-10-CM

## 2021-04-20 DIAGNOSIS — G43.001 MIGRAINE WITHOUT AURA AND WITH STATUS MIGRAINOSUS, NOT INTRACTABLE: ICD-10-CM

## 2021-04-20 RX ORDER — MECLIZINE HCL 12.5 MG/1
TABLET ORAL
Qty: 60 TABLET | Refills: 0 | Status: SHIPPED | OUTPATIENT
Start: 2021-04-20 | End: 2021-06-04

## 2021-04-20 RX ORDER — ALPRAZOLAM 0.5 MG/1
TABLET ORAL
Qty: 60 TABLET | Refills: 0 | Status: SHIPPED | OUTPATIENT
Start: 2021-04-20 | End: 2021-06-04

## 2021-04-20 RX ORDER — SUMATRIPTAN 100 MG/1
TABLET, FILM COATED ORAL
Qty: 9 TABLET | Refills: 0 | Status: SHIPPED | OUTPATIENT
Start: 2021-04-20 | End: 2021-06-04

## 2021-05-26 ENCOUNTER — LAB ENCOUNTER (OUTPATIENT)
Dept: LAB | Age: 56
End: 2021-05-26
Attending: INTERNAL MEDICINE
Payer: COMMERCIAL

## 2021-05-26 DIAGNOSIS — F41.1 GAD (GENERALIZED ANXIETY DISORDER): ICD-10-CM

## 2021-05-26 DIAGNOSIS — I05.0 RHEUMATIC MITRAL STENOSIS: ICD-10-CM

## 2021-05-26 DIAGNOSIS — E78.2 MIXED HYPERLIPIDEMIA: ICD-10-CM

## 2021-05-26 DIAGNOSIS — I50.32 CHRONIC DIASTOLIC CHF (CONGESTIVE HEART FAILURE) (HCC): ICD-10-CM

## 2021-05-26 DIAGNOSIS — I35.9 AORTIC VALVE DISORDER: ICD-10-CM

## 2021-05-26 DIAGNOSIS — J90 PLEURAL EFFUSION: ICD-10-CM

## 2021-05-26 DIAGNOSIS — I34.0 MODERATE MITRAL VALVE REGURGITATION: ICD-10-CM

## 2021-05-26 DIAGNOSIS — R06.02 SOB (SHORTNESS OF BREATH): ICD-10-CM

## 2021-05-26 DIAGNOSIS — E03.9 HYPOTHYROIDISM, UNSPECIFIED TYPE: ICD-10-CM

## 2021-05-26 PROCEDURE — 85025 COMPLETE CBC W/AUTO DIFF WBC: CPT

## 2021-05-26 PROCEDURE — 36415 COLL VENOUS BLD VENIPUNCTURE: CPT

## 2021-05-26 PROCEDURE — 83880 ASSAY OF NATRIURETIC PEPTIDE: CPT

## 2021-05-26 PROCEDURE — 80061 LIPID PANEL: CPT

## 2021-06-04 ENCOUNTER — OFFICE VISIT (OUTPATIENT)
Dept: FAMILY MEDICINE CLINIC | Facility: CLINIC | Age: 56
End: 2021-06-04
Payer: COMMERCIAL

## 2021-06-04 ENCOUNTER — TELEPHONE (OUTPATIENT)
Dept: FAMILY MEDICINE CLINIC | Facility: CLINIC | Age: 56
End: 2021-06-04

## 2021-06-04 VITALS
WEIGHT: 100 LBS | SYSTOLIC BLOOD PRESSURE: 84 MMHG | HEART RATE: 84 BPM | RESPIRATION RATE: 16 BRPM | TEMPERATURE: 98 F | HEIGHT: 58 IN | OXYGEN SATURATION: 98 % | BODY MASS INDEX: 20.99 KG/M2 | DIASTOLIC BLOOD PRESSURE: 60 MMHG

## 2021-06-04 DIAGNOSIS — I35.9 AORTIC VALVE DISORDER: Primary | ICD-10-CM

## 2021-06-04 DIAGNOSIS — E03.9 HYPOTHYROIDISM, UNSPECIFIED TYPE: ICD-10-CM

## 2021-06-04 DIAGNOSIS — G43.001 MIGRAINE WITHOUT AURA AND WITH STATUS MIGRAINOSUS, NOT INTRACTABLE: ICD-10-CM

## 2021-06-04 DIAGNOSIS — J84.09: ICD-10-CM

## 2021-06-04 DIAGNOSIS — F41.1 GAD (GENERALIZED ANXIETY DISORDER): ICD-10-CM

## 2021-06-04 DIAGNOSIS — I34.0 MODERATE MITRAL VALVE REGURGITATION: ICD-10-CM

## 2021-06-04 PROBLEM — J90 PLEURAL EFFUSION: Status: RESOLVED | Noted: 2021-03-05 | Resolved: 2021-06-04

## 2021-06-04 PROBLEM — R06.02 SOB (SHORTNESS OF BREATH): Status: RESOLVED | Noted: 2021-03-05 | Resolved: 2021-06-04

## 2021-06-04 PROCEDURE — 99215 OFFICE O/P EST HI 40 MIN: CPT | Performed by: FAMILY MEDICINE

## 2021-06-04 PROCEDURE — 3008F BODY MASS INDEX DOCD: CPT | Performed by: FAMILY MEDICINE

## 2021-06-04 PROCEDURE — 3078F DIAST BP <80 MM HG: CPT | Performed by: FAMILY MEDICINE

## 2021-06-04 PROCEDURE — 3074F SYST BP LT 130 MM HG: CPT | Performed by: FAMILY MEDICINE

## 2021-06-04 RX ORDER — ROSUVASTATIN CALCIUM 40 MG/1
40 TABLET, COATED ORAL NIGHTLY
Qty: 90 TABLET | Refills: 1 | Status: SHIPPED | OUTPATIENT
Start: 2021-06-04

## 2021-06-04 RX ORDER — ALPRAZOLAM 0.5 MG/1
0.5 TABLET ORAL 2 TIMES DAILY PRN
Qty: 60 TABLET | Refills: 1 | Status: SHIPPED | OUTPATIENT
Start: 2021-06-04 | End: 2021-07-20

## 2021-06-04 RX ORDER — ZOLPIDEM TARTRATE 10 MG/1
TABLET ORAL
Qty: 30 TABLET | Refills: 3 | Status: SHIPPED | OUTPATIENT
Start: 2021-06-04

## 2021-06-04 RX ORDER — SUMATRIPTAN 100 MG/1
TABLET, FILM COATED ORAL
Qty: 9 TABLET | Refills: 1 | Status: SHIPPED | OUTPATIENT
Start: 2021-06-04

## 2021-06-04 RX ORDER — MECLIZINE HCL 12.5 MG/1
25 TABLET ORAL 2 TIMES DAILY PRN
Qty: 60 TABLET | Refills: 1 | Status: SHIPPED | OUTPATIENT
Start: 2021-06-04

## 2021-06-04 RX ORDER — LEVOTHYROXINE SODIUM 0.1 MG/1
TABLET ORAL
Qty: 90 TABLET | Refills: 1 | Status: SHIPPED | OUTPATIENT
Start: 2021-06-04

## 2021-06-04 RX ORDER — ALBUTEROL SULFATE 90 UG/1
2 AEROSOL, METERED RESPIRATORY (INHALATION) EVERY 6 HOURS PRN
Qty: 1 EACH | Refills: 3 | Status: SHIPPED | OUTPATIENT
Start: 2021-06-04 | End: 2022-06-04

## 2021-06-04 NOTE — TELEPHONE ENCOUNTER
Pt here in office picking up paperwork. Pt requesting letter for work like the one Deannie Libman created on 3/4/21 with up to date return to work date. Pt states [employer] [place] paperwork states she is to return on 12/2021.  Pls advise, pt states she is willing to p/u

## 2021-06-04 NOTE — TELEPHONE ENCOUNTER
Patient seen at [address] today with PCP. Patient brought in Attending Physician's Statement from [facility] to be completed. JO ANN has not been completed. Attending Physician's Statement completed and signed by PCP. Copy sent to scan.

## 2021-06-04 NOTE — TELEPHONE ENCOUNTER
Notified the patient of the completed disability paperwork and unable to fax due to no JO ANN. Patient verbalized understanding. Patient states that she does not want the paperwork faxed to The [address]- \"I will give the paperwork to the appropriate person. \

## 2021-06-07 NOTE — PROGRESS NOTES
Chief Complaint:   Patient presents with:   Follow - Up    HPI:   This is a 54year old female presenting for follow up: Patient sees cardiology for a history of aortic valve disorder,   With a history of mixed hyperlipidemia with aortic valve disorder with Hyperlipidemia    • Hyperthyroidism    • Hypothyroidism    • Insomnia    • Left ventricular ejection fraction greater than 40% 1/29/16    65%   • Lung disease    • Migraine    • Migraines    • Mitral regurgitation     mild   • Mitral stenosis    • Multiple AFTER 1 HOUR FOR A MAX OF 2 PER DAY 9 tablet 1   • ALPRAZolam 0.5 MG Oral Tab Take 1 tablet (0.5 mg total) by mouth 2 (two) times daily as needed for Sleep.  60 tablet 1   • Albuterol Sulfate  (90 Base) MCG/ACT Inhalation Aero Soln Inhale 2 puffs int pain, discharge, redness, itching and visual disturbance. Respiratory: Negative for cough, chest tightness and shortness of breath. Cardiovascular: Negative for chest pain, palpitations and leg swelling.    Gastrointestinal: Negative for vomiting, abdo Eyes: Pupils are equal, round, and reactive to light. Conjunctivae are normal. Right eye exhibits no discharge. Left eye exhibits no discharge. No scleral icterus. Neck: Neck supple. No JVD present. No tracheal deviation present.  No thyromegaly present CPM  - Levothyroxine Sodium 100 MCG Oral Tab; TAKE ONE TABLET BY MOUTH EVERY DAY BEFORE BREAKFAST  Dispense: 90 tablet; Refill: 1    4. Moderate mitral valve regurgitation  -will need replacement per CT surgery    5.  Migraine without aura and with status m

## 2021-06-07 NOTE — TELEPHONE ENCOUNTER
Pt is requesting a return to work letter. She states her paperwork filled out at her [address] is for 12/2021. Is this accurte? OK for letter? (Works forms not yet been scanned in).

## 2021-06-08 NOTE — TELEPHONE ENCOUNTER
Received verbal order from PCP. [facility] for work note. Work note completed and signed by PCP. Letter at  for the patient to . Notified the patient via phone.

## 2021-07-02 ENCOUNTER — HOSPITAL ENCOUNTER (OUTPATIENT)
Dept: GENERAL RADIOLOGY | Age: 56
Discharge: HOME | End: 2021-07-02
Attending: NURSE PRACTITIONER
Payer: COMMERCIAL

## 2021-07-02 ENCOUNTER — OFFICE VISIT (OUTPATIENT)
Dept: FAMILY MEDICINE CLINIC | Facility: CLINIC | Age: 56
End: 2021-07-02
Payer: COMMERCIAL

## 2021-07-02 ENCOUNTER — TELEPHONE (OUTPATIENT)
Dept: FAMILY MEDICINE CLINIC | Facility: CLINIC | Age: 56
End: 2021-07-02

## 2021-07-02 VITALS
WEIGHT: 98.13 LBS | HEIGHT: 58 IN | TEMPERATURE: 97 F | SYSTOLIC BLOOD PRESSURE: 126 MMHG | BODY MASS INDEX: 20.6 KG/M2 | OXYGEN SATURATION: 99 % | HEART RATE: 94 BPM | DIASTOLIC BLOOD PRESSURE: 82 MMHG | RESPIRATION RATE: 16 BRPM

## 2021-07-02 DIAGNOSIS — R10.11 RUQ PAIN: ICD-10-CM

## 2021-07-02 DIAGNOSIS — Z79.2 PROPHYLACTIC ANTIBIOTIC: ICD-10-CM

## 2021-07-02 DIAGNOSIS — R10.11 RUQ PAIN: Primary | ICD-10-CM

## 2021-07-02 PROCEDURE — 99214 OFFICE O/P EST MOD 30 MIN: CPT | Performed by: NURSE PRACTITIONER

## 2021-07-02 PROCEDURE — 3074F SYST BP LT 130 MM HG: CPT | Performed by: NURSE PRACTITIONER

## 2021-07-02 PROCEDURE — 71111 X-RAY EXAM RIBS/CHEST4/> VWS: CPT | Performed by: NURSE PRACTITIONER

## 2021-07-02 PROCEDURE — 3008F BODY MASS INDEX DOCD: CPT | Performed by: NURSE PRACTITIONER

## 2021-07-02 PROCEDURE — 3079F DIAST BP 80-89 MM HG: CPT | Performed by: NURSE PRACTITIONER

## 2021-07-02 RX ORDER — LIDOCAINE 4 G/G
1 PATCH TOPICAL EVERY 24 HOURS
Qty: 6 PATCH | Refills: 0 | Status: SHIPPED | OUTPATIENT
Start: 2021-07-02

## 2021-07-02 RX ORDER — AMOXICILLIN 500 MG/1
CAPSULE ORAL
Qty: 4 CAPSULE | Refills: 0 | Status: SHIPPED | OUTPATIENT
Start: 2021-07-02

## 2021-07-02 NOTE — PROGRESS NOTES
Chief Complaint:   Patient presents with:  Pain: C/o right side abd pain x3 days. Pain rated 7/10. HPI:   This is a 54year old female presenting for evaluation of RUQ abdominal pain x 3 days. Pain started suddenly when she was waking up in the morning. FIBROSIS.      Social History:  Social History    Tobacco Use      Smoking status: Current Every Day Smoker        Packs/day: 0.10        Years: 39.00        Pack years: 3.9        Start date: 1/29/1980      Smokeless tobacco: Never Used    Vaping Use CINNAMON OR Take by mouth. • Flaxseed, Linseed, (FLAX SEED OIL OR) Take by mouth. • PROAIR  (90 Base) MCG/ACT Inhalation Aero Soln Inhale 2 puffs into the lungs every 4 (four) hours as needed.  3 Inhaler 0   • aspirin ( ASPIRIN) 81 MG Oral intact. Conjunctiva/sclera: Conjunctivae normal.      Pupils: Pupils are equal, round, and reactive to light. Cardiovascular:      Rate and Rhythm: Normal rate and regular rhythm. Heart sounds: Murmur heard.      Pulmonary:      Effort: Pulmonar

## 2021-07-02 NOTE — TELEPHONE ENCOUNTER
----- Message from EDMUND Bynum FNP-C sent at 7/2/2021 12:35 PM CDT -----  No acute abnormalities. Ok for Lidoderm 4% patch to area. Apply 1 patch every 24 hours. Ok to send 1 package to pharmacy. Alternate ice/heat prn.  Needs to complete labs and ab

## 2021-07-02 NOTE — PATIENT INSTRUCTIONS
Kalani Talbert,     It was nice to see you today. Here is a recap of our visit:    1. Complete your STAT chest x-ray. I will call you with the results.     2. Consider the blood tests and abdominal ultrasound, especially if your x-ray is unremarkable and your pain

## 2021-07-19 DIAGNOSIS — F41.1 GAD (GENERALIZED ANXIETY DISORDER): ICD-10-CM

## 2021-07-20 RX ORDER — ALPRAZOLAM 0.5 MG/1
TABLET ORAL
Qty: 60 TABLET | Refills: 0 | Status: SHIPPED | OUTPATIENT
Start: 2021-07-20

## 2021-07-20 NOTE — TELEPHONE ENCOUNTER
Medication(s) to Refill:   Requested Prescriptions     Pending Prescriptions Disp Refills   • ALPRAZOLAM 0.5 MG Oral Tab [Pharmacy Med Name: Alprazolam 0.5 Mg Tab Acta] 60 tablet 0     Sig: TAKE ONE TABLET BY MOUTH TWICE DAILY AS NEEDED FOR SLEEP         R

## (undated) NOTE — ED AVS SNAPSHOT
Jesus Lunsford   MRN: [id number]    Department:  [facility] Emergency Department in [place]   Date of Visit:  9/19/2019           Disclosure     Insurance plans vary and the physician(s) referred by the ER may not be covered by your plan.  Please contac tell this physician (or your personal doctor if your instructions are to return to your personal doctor) about any new or lasting problems. The primary care or specialist physician will see patients referred from the [facility] Emergency Department.  Arthor Bernheim

## (undated) NOTE — LETTER
Date: 11/23/2020    Patient Name: Chaz Long          To Whom it may concern: The above patient was seen at the [facility] for treatment of a medical condition. This patient has been suffering from several health conditions.   Due

## (undated) NOTE — LETTER
February 19, 2018    Patient: Gabrielle Biswas   Date of Visit: 2/19/2018       To Whom It May Concern:    Armida Wright was seen and treated in our emergency department on 2/19/2018. She should not return to work until 2/22/2018.     If you have any que

## (undated) NOTE — LETTER
05/16/19        [address]  [facility] [address]      Dear Twyla Gaucher,    [address] records indicate that you have outstanding lab work and or testing that was ordered for you and has not yet been completed:  Orders Placed This Encounter

## (undated) NOTE — LETTER
10/02/17        [address]      Dear Adelaida Campbell,    [facility] records indicate that you have outstanding lab work and or testing that was ordered for you and has not yet been completed:          TSH W Reflex To Free T4 [E

## (undated) NOTE — LETTER
Date: 6/8/2021    Patient Name: Moises Taveras  10/6/1965        To Whom it may concern:     The above patient was seen at the [facility] for treatment of a medical condition.     Due to Colleen's chronic medical conditions, including significa

## (undated) NOTE — Clinical Note
Asha Muniz, you're seeing this patient this morning. Wanted to forward you my visit note after meeting with her. She seems to be doing worse since her last visit with Dr. Fifi Kerr. She has not yet scheduled pulmonary rehab, but plans to do so today.  We ar

## (undated) NOTE — LETTER
06/11/18        [address]letier      Dear Zay Muñoz,    [facility] records indicate that you have outstanding lab work and or testing that was ordered for you and has not yet been completed:          Lipid Panel [E]      TSH a

## (undated) NOTE — LETTER
Date: 3/4/2021    Patient Name: Brianna MarcusB: 10/06/1965    To Whom it may concern:     The above patient was seen at the [facility] for treatment of a medical condition.     Due to Colleen's chronic medical conditions, including signific

## (undated) NOTE — LETTER
January 18, 2018    Patient: Max Farias   Date of Visit: 1/18/2018       To Whom It May Concern:    Monroe Taveras was seen and treated in our emergency department on 1/18/2018. She may return to work on  1/21/18.     If you have any questions or co

## (undated) NOTE — ED AVS SNAPSHOT
Darren Credit   MRN: [id number]    Department:  [facility] Emergency Department in [place]   Date of Visit:  1/18/2018           Disclosure     Insurance plans vary and the physician(s) referred by the ER may not be covered by your plan.  Please contac tell this physician (or your personal doctor if your instructions are to return to your personal doctor) about any new or lasting problems. The primary care or specialist physician will see patients referred from the [facility] Emergency Department.  Severo Pastures

## (undated) NOTE — LETTER
Date: 11/3/2020    Patient Name: Sameer Saenz          To Whom it may concern: The above patient was seen at the [facility] for treatment of a medical condition.     Due to patients heart condition she is at high risk of lisa Covi

## (undated) NOTE — LETTER
11/26/17        [address]      Dear Jefferson Borrego,    [facility] records indicate that you have outstanding lab work and or testing that was ordered for you and has not yet been completed:          Comp Metabolic Panel (14)

## (undated) NOTE — LETTER
02/14/19        [address]  [place] [address]      Dear Crispin Yadav,    [address] [facility] records indicate that you have outstanding lab work and or testing that was ordered for you and has not yet been completed:        TITA SCREENING ROSE (CP

## (undated) NOTE — ED AVS SNAPSHOT
Hawa Swain   MRN: [id number]    Department:  [facility] in [place]   Date of Visit:  2/19/2018           Disclosure     Insurance plans vary and the physician(s) referred by the ER may not be covered by your plan.  Please contac tell this physician (or your personal doctor if your instructions are to return to your personal doctor) about any new or lasting problems. The primary care or specialist physician will see patients referred from the [facility] Emergency Department.  Lazaro Michelle

## (undated) NOTE — LETTER
03/02/18        [address]      Dear Zay Muñoz,    [address] [facility] records indicate that you have lab work and or testing that was ordered for you and has not yet been completed:          TSH and Free T4 [E]      CBC With Diff